# Patient Record
Sex: MALE | Race: WHITE | HISPANIC OR LATINO | Employment: FULL TIME | URBAN - METROPOLITAN AREA
[De-identification: names, ages, dates, MRNs, and addresses within clinical notes are randomized per-mention and may not be internally consistent; named-entity substitution may affect disease eponyms.]

---

## 2020-09-10 ENCOUNTER — APPOINTMENT (EMERGENCY)
Dept: CT IMAGING | Facility: HOSPITAL | Age: 45
End: 2020-09-10
Payer: COMMERCIAL

## 2020-09-10 ENCOUNTER — HOSPITAL ENCOUNTER (OUTPATIENT)
Facility: HOSPITAL | Age: 45
Setting detail: OBSERVATION
Discharge: HOME | End: 2020-09-12
Attending: EMERGENCY MEDICINE | Admitting: EMERGENCY MEDICINE
Payer: COMMERCIAL

## 2020-09-10 DIAGNOSIS — H53.8 BLURRED VISION, BILATERAL: ICD-10-CM

## 2020-09-10 DIAGNOSIS — R29.898 WEAKNESS OF BOTH LOWER EXTREMITIES: ICD-10-CM

## 2020-09-10 DIAGNOSIS — R29.898 LEFT ARM WEAKNESS: ICD-10-CM

## 2020-09-10 DIAGNOSIS — R29.898 WEAKNESS OF LEFT LOWER EXTREMITY: ICD-10-CM

## 2020-09-10 DIAGNOSIS — R51.9 HEADACHE: ICD-10-CM

## 2020-09-10 DIAGNOSIS — R07.9 CHEST PAIN: Primary | ICD-10-CM

## 2020-09-10 LAB
ALBUMIN SERPL BCP-MCNC: 3.5 G/DL (ref 3.5–5)
ALP SERPL-CCNC: 79 U/L (ref 46–116)
ALT SERPL W P-5'-P-CCNC: 43 U/L (ref 12–78)
ANION GAP SERPL CALCULATED.3IONS-SCNC: 10 MMOL/L (ref 4–13)
APTT PPP: 29 SECONDS (ref 23–37)
AST SERPL W P-5'-P-CCNC: 19 U/L (ref 5–45)
ATRIAL RATE: 87 BPM
BASOPHILS # BLD AUTO: 0.03 THOUSANDS/ΜL (ref 0–0.1)
BASOPHILS NFR BLD AUTO: 0 % (ref 0–1)
BILIRUB DIRECT SERPL-MCNC: 0.1 MG/DL (ref 0–0.2)
BILIRUB SERPL-MCNC: 0.4 MG/DL (ref 0.2–1)
BUN SERPL-MCNC: 13 MG/DL (ref 5–25)
CALCIUM SERPL-MCNC: 9.2 MG/DL (ref 8.3–10.1)
CHLORIDE SERPL-SCNC: 101 MMOL/L (ref 100–108)
CO2 SERPL-SCNC: 24 MMOL/L (ref 21–32)
CREAT SERPL-MCNC: 1.18 MG/DL (ref 0.6–1.3)
EOSINOPHIL # BLD AUTO: 0.17 THOUSAND/ΜL (ref 0–0.61)
EOSINOPHIL NFR BLD AUTO: 2 % (ref 0–6)
ERYTHROCYTE [DISTWIDTH] IN BLOOD BY AUTOMATED COUNT: 12.9 % (ref 11.6–15.1)
GFR SERPL CREATININE-BSD FRML MDRD: 75 ML/MIN/1.73SQ M
GLUCOSE SERPL-MCNC: 103 MG/DL (ref 65–140)
HCT VFR BLD AUTO: 53.7 % (ref 36.5–49.3)
HGB BLD-MCNC: 18 G/DL (ref 12–17)
IMM GRANULOCYTES # BLD AUTO: 0.02 THOUSAND/UL (ref 0–0.2)
IMM GRANULOCYTES NFR BLD AUTO: 0 % (ref 0–2)
INR PPP: 0.99 (ref 0.84–1.19)
LACTATE SERPL-SCNC: 1.5 MMOL/L (ref 0.5–2)
LIPASE SERPL-CCNC: 124 U/L (ref 73–393)
LYMPHOCYTES # BLD AUTO: 3.16 THOUSANDS/ΜL (ref 0.6–4.47)
LYMPHOCYTES NFR BLD AUTO: 37 % (ref 14–44)
MCH RBC QN AUTO: 30.1 PG (ref 26.8–34.3)
MCHC RBC AUTO-ENTMCNC: 33.5 G/DL (ref 31.4–37.4)
MCV RBC AUTO: 90 FL (ref 82–98)
MONOCYTES # BLD AUTO: 0.5 THOUSAND/ΜL (ref 0.17–1.22)
MONOCYTES NFR BLD AUTO: 6 % (ref 4–12)
NEUTROPHILS # BLD AUTO: 4.6 THOUSANDS/ΜL (ref 1.85–7.62)
NEUTS SEG NFR BLD AUTO: 55 % (ref 43–75)
NRBC BLD AUTO-RTO: 0 /100 WBCS
NT-PROBNP SERPL-MCNC: 129 PG/ML
P AXIS: 118 DEGREES
PLATELET # BLD AUTO: 349 THOUSANDS/UL (ref 149–390)
PMV BLD AUTO: 9.8 FL (ref 8.9–12.7)
POTASSIUM SERPL-SCNC: 4.2 MMOL/L (ref 3.5–5.3)
PR INTERVAL: 140 MS
PROT SERPL-MCNC: 7.4 G/DL (ref 6.4–8.2)
PROTHROMBIN TIME: 12.6 SECONDS (ref 11.6–14.5)
QRS AXIS: 79 DEGREES
QRSD INTERVAL: 80 MS
QT INTERVAL: 338 MS
QTC INTERVAL: 406 MS
RBC # BLD AUTO: 5.99 MILLION/UL (ref 3.88–5.62)
SODIUM SERPL-SCNC: 135 MMOL/L (ref 136–145)
T WAVE AXIS: 182 DEGREES
TROPONIN I SERPL-MCNC: <0.02 NG/ML
VENTRICULAR RATE: 87 BPM
WBC # BLD AUTO: 8.48 THOUSAND/UL (ref 4.31–10.16)

## 2020-09-10 PROCEDURE — 83690 ASSAY OF LIPASE: CPT | Performed by: PHYSICIAN ASSISTANT

## 2020-09-10 PROCEDURE — 74174 CTA ABD&PLVS W/CONTRAST: CPT

## 2020-09-10 PROCEDURE — 71275 CT ANGIOGRAPHY CHEST: CPT

## 2020-09-10 PROCEDURE — 85730 THROMBOPLASTIN TIME PARTIAL: CPT | Performed by: PHYSICIAN ASSISTANT

## 2020-09-10 PROCEDURE — 80048 BASIC METABOLIC PNL TOTAL CA: CPT | Performed by: PHYSICIAN ASSISTANT

## 2020-09-10 PROCEDURE — 83880 ASSAY OF NATRIURETIC PEPTIDE: CPT | Performed by: PHYSICIAN ASSISTANT

## 2020-09-10 PROCEDURE — 99285 EMERGENCY DEPT VISIT HI MDM: CPT

## 2020-09-10 PROCEDURE — 70496 CT ANGIOGRAPHY HEAD: CPT

## 2020-09-10 PROCEDURE — 93005 ELECTROCARDIOGRAM TRACING: CPT

## 2020-09-10 PROCEDURE — 85610 PROTHROMBIN TIME: CPT | Performed by: PHYSICIAN ASSISTANT

## 2020-09-10 PROCEDURE — 99291 CRITICAL CARE FIRST HOUR: CPT | Performed by: PHYSICIAN ASSISTANT

## 2020-09-10 PROCEDURE — 84484 ASSAY OF TROPONIN QUANT: CPT | Performed by: STUDENT IN AN ORGANIZED HEALTH CARE EDUCATION/TRAINING PROGRAM

## 2020-09-10 PROCEDURE — 99220 PR INITIAL OBSERVATION CARE/DAY 70 MINUTES: CPT | Performed by: STUDENT IN AN ORGANIZED HEALTH CARE EDUCATION/TRAINING PROGRAM

## 2020-09-10 PROCEDURE — G1004 CDSM NDSC: HCPCS

## 2020-09-10 PROCEDURE — 84484 ASSAY OF TROPONIN QUANT: CPT | Performed by: PHYSICIAN ASSISTANT

## 2020-09-10 PROCEDURE — 83605 ASSAY OF LACTIC ACID: CPT | Performed by: PHYSICIAN ASSISTANT

## 2020-09-10 PROCEDURE — 36415 COLL VENOUS BLD VENIPUNCTURE: CPT | Performed by: PHYSICIAN ASSISTANT

## 2020-09-10 PROCEDURE — 80076 HEPATIC FUNCTION PANEL: CPT | Performed by: PHYSICIAN ASSISTANT

## 2020-09-10 PROCEDURE — 70498 CT ANGIOGRAPHY NECK: CPT

## 2020-09-10 PROCEDURE — 93010 ELECTROCARDIOGRAM REPORT: CPT | Performed by: INTERNAL MEDICINE

## 2020-09-10 PROCEDURE — 85025 COMPLETE CBC W/AUTO DIFF WBC: CPT | Performed by: PHYSICIAN ASSISTANT

## 2020-09-10 RX ORDER — ASPIRIN 81 MG/1
81 TABLET, CHEWABLE ORAL DAILY
Status: DISCONTINUED | OUTPATIENT
Start: 2020-09-10 | End: 2020-09-12 | Stop reason: HOSPADM

## 2020-09-10 RX ORDER — KETOROLAC TROMETHAMINE 30 MG/ML
30 INJECTION, SOLUTION INTRAMUSCULAR; INTRAVENOUS EVERY 12 HOURS SCHEDULED
Status: DISCONTINUED | OUTPATIENT
Start: 2020-09-10 | End: 2020-09-11

## 2020-09-10 RX ORDER — DIPHENHYDRAMINE HYDROCHLORIDE 50 MG/ML
25 INJECTION INTRAMUSCULAR; INTRAVENOUS EVERY 8 HOURS PRN
Status: DISCONTINUED | OUTPATIENT
Start: 2020-09-10 | End: 2020-09-12 | Stop reason: HOSPADM

## 2020-09-10 RX ORDER — ATORVASTATIN CALCIUM 40 MG/1
40 TABLET, FILM COATED ORAL EVERY EVENING
Status: DISCONTINUED | OUTPATIENT
Start: 2020-09-10 | End: 2020-09-12 | Stop reason: HOSPADM

## 2020-09-10 RX ORDER — OXYCODONE HYDROCHLORIDE 5 MG/1
5 TABLET ORAL EVERY 6 HOURS PRN
Status: DISCONTINUED | OUTPATIENT
Start: 2020-09-10 | End: 2020-09-12 | Stop reason: HOSPADM

## 2020-09-10 RX ORDER — ACETAMINOPHEN 325 MG/1
650 TABLET ORAL EVERY 6 HOURS PRN
Status: DISCONTINUED | OUTPATIENT
Start: 2020-09-10 | End: 2020-09-12 | Stop reason: HOSPADM

## 2020-09-10 RX ORDER — METOCLOPRAMIDE HYDROCHLORIDE 5 MG/ML
10 INJECTION INTRAMUSCULAR; INTRAVENOUS EVERY 8 HOURS SCHEDULED
Status: DISCONTINUED | OUTPATIENT
Start: 2020-09-10 | End: 2020-09-12 | Stop reason: HOSPADM

## 2020-09-10 RX ORDER — HYDROMORPHONE HCL/PF 1 MG/ML
0.2 SYRINGE (ML) INJECTION EVERY 4 HOURS PRN
Status: DISCONTINUED | OUTPATIENT
Start: 2020-09-10 | End: 2020-09-12 | Stop reason: HOSPADM

## 2020-09-10 RX ADMIN — IOHEXOL 120 ML: 350 INJECTION, SOLUTION INTRAVENOUS at 11:13

## 2020-09-10 RX ADMIN — KETOROLAC TROMETHAMINE 30 MG: 30 INJECTION, SOLUTION INTRAMUSCULAR at 20:33

## 2020-09-10 RX ADMIN — METOCLOPRAMIDE HYDROCHLORIDE 10 MG: 5 INJECTION INTRAMUSCULAR; INTRAVENOUS at 18:16

## 2020-09-10 RX ADMIN — METOCLOPRAMIDE HYDROCHLORIDE 10 MG: 5 INJECTION INTRAMUSCULAR; INTRAVENOUS at 21:41

## 2020-09-10 RX ADMIN — ACETAMINOPHEN 650 MG: 325 TABLET, FILM COATED ORAL at 18:16

## 2020-09-10 NOTE — CASE MANAGEMENT
LOS 4 HOURS  RISK OF UNPLANNED READMISSION SCORE N/A  30 DAY READMISSION: NO  BUNDLE: NO    [place] with wife, Chiara Huff  Patient IPTA with no use of AD  No Hx VNA, STR, MH, or SA identified during this assessment  Patient did not identify a pharmacy as stating he does not take any Rx  Per patient's insurance card, patient does has a pharmacy plan  CM reviewed discharge planning process including the following: identifying help at home, patient preference for discharge planning needs, pharmacy preference, and availability of treatment team to discuss questions or concerns patient and/or family may have regarding understanding medications and recognizing signs and symptoms once discharged  CM also encouraged patient to follow up with all recommended appointments after discharge  Patient advised of importance for patient and family to participate in managing patients medical well being  CM name and role reviewed  Discharge Checklist reviewed and CM will continue to monitor for progress toward discharge goals in nursing and provider rounds  Patient works in [employer] for [employer]  At this time, plan is for cardiac workup including EKG, lab, chest CT, and CTA  No CM needs at this time  CM department to follow through discharge home with wife

## 2020-09-10 NOTE — ASSESSMENT & PLAN NOTE
Patient also complained of having epigastric chest pain, difficult to describe  Currently denies dyspnea, nausea, diaphoreses  Troponin negative x1  Continue trend troponin for 2 more times  Continue tele monitoring  Follow-up with hemoglobin A1c, lipid panel  Patient is agreeable with above plan

## 2020-09-10 NOTE — PROGRESS NOTES
Progress Note - Osbaldo Carrasco 1975, 39 y o  male MRN: [id number]    Unit/Bed#: ED 27 Encounter: [id number]    Primary Care Provider: No primary care provider on file  Date and time admitted to hospital: 9/10/2020 10:51 AM        * Headache  Assessment & Plan  39 male with past medical history of obesity, no other reported past medical history  Currently not on any medication  Presented with complaining of excruciating headache associated with blurry vision, chest pain, left-sided arm weakness  Patient reports that his symptom has been ongoing for last 2-3 weeks but this morning noted with excruciating headache which prompted him to come to ER  CTA head and neck negative for acute finding  CTA chest abdominal pelvis negative for acute finding  Labs unremarkable  On my encounter patient appears comfortable not in distress  Patient has multiple complaints but excruciating headache appears to be primary concern  Suspected complex migraine vs TIA  Will give migraine cocktail  Tylenol p r n  May consider using narcotics if not controlled with above  Follow-up with neurology consult  Patient is agreeable with above plan  Chest pain  Assessment & Plan  Patient also complained of having epigastric chest pain, difficult to describe  Currently denies dyspnea, nausea, diaphoreses  Troponin negative x1  Continue trend troponin for 2 more times  Continue tele monitoring  Follow-up with hemoglobin A1c, lipid panel  Patient is agreeable with above plan  VTE Prophylaxis: Enoxaparin (Lovenox)    Code Status:  Level 1 full code  POLST: POLST form is not discussed and not completed at this time  Discussion with family:  Not present at bedside  Anticipated Length of Stay:  Patient will be admitted on an Observation basis with an anticipated length of stay of  < 2 midnights     Justification for Hospital Stay:  Multiple complaints including excruciating headache, chest pain, left arm weakness  Total Time for Visit, including Counseling / Coordination of Care: 1 hour  Greater than 50% of this total time spent on direct patient counseling and coordination of care  Chief Complaint:   Multiple complaints    History of Present Illness:    Yadiel Agrawal is a 39 y o  male with past medical history obesity, no other reported past medical history who presents with multiple complaints  Patient has multiple complaints on presentation which is not able to elaborate in details  Reports that for last 2-3 weeks he has been having intermittent excruciating frontal headache associated with chest discomfort and left arm weakness  Reports this morning his headache was excruciating prompted him to come to ER  On my encounter patient is comfortable and not in distress  He does complain of having excruciating headache associated with blurry vision, chest discomfort and left arm weakness  Is requesting for pain medication for headache  Headache appears to be mostly concerning complaints at this time  Patient is also complaining of left index and middle finger pain and inability to flex due to pain  Denies neck stiffness, nausea, vomiting, fever, chills, skin rash, abdominal pain, dysuria, diarrhea, recent travel, recent sick contact, any other new complaints  Denies smoking  Denies any other substance use  Level 1 full code  No other events reported  Review of Systems:    Review of Systems   Constitutional: Negative for chills, diaphoresis, fatigue and fever  HENT: Negative for congestion  Eyes: Positive for visual disturbance  Respiratory: Negative for choking, chest tightness and shortness of breath  Cardiovascular: Positive for chest pain  Gastrointestinal: Negative for abdominal distention, diarrhea, nausea and vomiting  Endocrine: Negative for polyuria  Genitourinary: Negative for dysuria  Musculoskeletal: Negative for neck pain and neck stiffness     Skin: Negative for rash  Neurological: Positive for weakness and headaches  Negative for dizziness, tremors, syncope, facial asymmetry, speech difficulty, light-headedness and numbness  Psychiatric/Behavioral: Negative for agitation  Past Medical and Surgical History:     History reviewed  No pertinent past medical history  History reviewed  No pertinent surgical history  Meds/Allergies:    Prior to Admission medications    Not on File     I have reviewed home medications with patient personally  Allergies: No Known Allergies    Social History:     Marital Status: /Civil Union     Substance Use History:   Social History     Substance and Sexual Activity   Alcohol Use Yes    Frequency: 2-4 times a month    Drinks per session: 1 or 2     Social History     Tobacco Use   Smoking Status Never Smoker   Smokeless Tobacco Never Used     Social History     Substance and Sexual Activity   Drug Use Never       Family History:    non-contributory    Physical Exam:     Vitals:   Blood Pressure: 139/88 (09/10/20 1630)  Pulse: 88 (09/10/20 1630)  Temperature: 98 4 °F (36 9 °C) (09/10/20 1049)  Temp Source: Oral (09/10/20 1049)  Respirations: 17 (09/10/20 1630)  Height: 5' 8" (172 7 cm) (09/10/20 1049)  Weight - Scale: 109 kg (240 lb) (09/10/20 1049)  SpO2: 97 % (09/10/20 1630)    Physical Exam  Constitutional:       General: He is not in acute distress  Appearance: Normal appearance  He is not ill-appearing  HENT:      Head: Normocephalic and atraumatic  Nose: No rhinorrhea  Mouth/Throat:      Pharynx: No oropharyngeal exudate or posterior oropharyngeal erythema  Eyes:      General: No scleral icterus  Right eye: No discharge  Left eye: No discharge  Extraocular Movements: Extraocular movements intact  Conjunctiva/sclera: Conjunctivae normal       Pupils: Pupils are equal, round, and reactive to light     Neck:      Musculoskeletal: Normal range of motion and neck supple  No neck rigidity or muscular tenderness  Cardiovascular:      Rate and Rhythm: Normal rate and regular rhythm  Pulses: Normal pulses  Heart sounds: Normal heart sounds  Pulmonary:      Effort: Pulmonary effort is normal  No respiratory distress  Breath sounds: Normal breath sounds  No stridor  No wheezing or rhonchi  Abdominal:      General: Bowel sounds are normal  There is no distension  Palpations: Abdomen is soft  Tenderness: There is no abdominal tenderness  Musculoskeletal: Normal range of motion  General: No swelling  Comments: Left hand noted without any erythema, edema around index and middle finger  No obvious signs of trauma or inflammation noted  Skin:     Findings: No rash  Neurological:      General: No focal deficit present  Mental Status: He is alert and oriented to person, place, and time  Mental status is at baseline  Comments: No facial asymmetry noted  Left arm weakness noted, 4/5, no other focal gross neurological abnormality noted  Psychiatric:         Mood and Affect: Mood normal          Behavior: Behavior normal          Additional Data:     Lab Results: I have personally reviewed pertinent reports  Results from last 7 days   Lab Units 09/10/20  1105   WBC Thousand/uL 8 48   HEMOGLOBIN g/dL 18 0*   HEMATOCRIT % 53 7*   PLATELETS Thousands/uL 349   NEUTROS PCT % 55   LYMPHS PCT % 37   MONOS PCT % 6   EOS PCT % 2     Results from last 7 days   Lab Units 09/10/20  1206   SODIUM mmol/L 135*   POTASSIUM mmol/L 4 2   CHLORIDE mmol/L 101   CO2 mmol/L 24   BUN mg/dL 13   CREATININE mg/dL 1 18   ANION GAP mmol/L 10   CALCIUM mg/dL 9 2   ALBUMIN g/dL 3 5   TOTAL BILIRUBIN mg/dL 0 40   ALK PHOS U/L 79   ALT U/L 43   AST U/L 19   GLUCOSE RANDOM mg/dL 103     Results from last 7 days   Lab Units 09/10/20  1105   INR  0 99             Results from last 7 days   Lab Units 09/10/20  1124   LACTIC ACID mmol/L 1 5       Imaging:  I have personally reviewed pertinent reports  CTA dissection protocol chest abdomen pelvis w wo contrast   Final Result by Roman Mary MD (09/10 5129)      No CT angiographic abnormality to account for the patient's symptoms  Specifically, no evidence of aneurysm, arterial vascular dissection, or flow-limiting vascular stenosis in the chest, abdomen or pelvis  Hepatic steatosis  Workstation performed: [id number]         CTA head and neck with and without contrast   Final Result by Stan Velazquez MD (09/10 4399)      No mass effect, acute intracranial hemorrhage or evidence of recent infarction  No evidence for high-grade stenosis, focal occlusion or vascular aneurysm of the intracranial circulation  No evidence for high-grade stenosis or focal occlusion of the cervical vasculature  Workstation performed: [id number]             EKG, Pathology, and Other Studies Reviewed on Admission:   · EKG:  Not available to review if done    Allscripts / Epic Records Reviewed: Yes     ** Please Note: This note has been constructed using a voice recognition system   **

## 2020-09-10 NOTE — ASSESSMENT & PLAN NOTE
39 male with past medical history of obesity, no other reported past medical history  Currently not on any medication  Presented with complaining of excruciating headache associated with blurry vision, chest pain, left-sided arm weakness  Patient reports that his symptom has been ongoing for last 2-3 weeks but this morning noted with excruciating headache which prompted him to come to ER  CTA head and neck negative for acute finding  CTA chest abdominal pelvis negative for acute finding  Labs unremarkable  On my encounter patient appears comfortable not in distress  Patient has multiple complaints but excruciating headache appears to be primary concern  Suspected complex migraine vs TIA  Will give migraine cocktail  Tylenol p r n  May consider using narcotics if not controlled with above  Follow-up with neurology consult  Patient is agreeable with above plan

## 2020-09-10 NOTE — LETTER
[address] [address]  Dept: [phone]    September 12, 2020     Patient: Ethan Sutherland   YOB: 1975   Date of Visit: 9/10/2020       To Whom it May Concern:    Ethan Sutherland was under our professional care  He was seen in the hospital from 9/10/2020   to 09/12/20  May return to work on or after 09/18/2020 if he continues to feel well  Recommend re-evaluation by primary care provider before returning to work if clinically not feeling well to return to work  If you have any questions or concerns, please don't hesitate to call           Sincerely,          Immanuel Tiwari MD

## 2020-09-10 NOTE — ED PROVIDER NOTES
History  Chief Complaint   Patient presents with    Headache     Pt reports yesterday he started exp blurry vision, and unsteady gait; today woke up with a severe HA   Blurred Vision     39year old male with no significant past medical history presents to the emergency department with chief complaint of bilateral blurred vision, difficulty walking and left arm weakness has headache starting at 1 day ago  Location of symptoms reported as both legs, left arm, eyes and head  Quality is reported as blurred vision to both eyes  Quality is reported as weakness to both legs and left arm  Severity is reported as moderate  Associated symptoms:  Positive for chest pain  Denies shortness of breath  Denies diaphoresis  Denies fevers  Positive for lightheadedness  Positive for headache  Denies vomiting  Denies loss of vision  Denies loss of hearing  Positive for difficulty walking  Denies abdominal pain  Modifying factors:  Patient reports unsure of any aggravating or alleviating factors  Context:  Denies any recent fall injury or trauma  Denies prior similar episodes in the past   Patient states yesterday he was experiencing some substernal chest pain described as sharp  He reports he was noticing weakness in both his arms and his legs yesterday  This morning he woke up with left greater than right arm weakness associated with headache  He also notice some increasing blurred vision which started yesterday knee feels is worse today  He denies any over-the-counter medications  He works for the [employer] in [place]  Reviewed past visits via [employer]:  No prior visits to this emergency department        History provided by:  Patient   used: No    Headache   Associated symptoms: dizziness, fatigue and weakness    Associated symptoms: no abdominal pain, no back pain, no congestion, no cough, no diarrhea, no drainage, no ear pain, no eye pain, no fever, no hearing loss, no myalgias, no nausea, no neck pain, no neck stiffness, no numbness, no photophobia, no seizures, no sinus pressure, no sore throat and no vomiting        None       History reviewed  No pertinent past medical history  History reviewed  No pertinent surgical history  History reviewed  No pertinent family history  I have reviewed and agree with the history as documented  E-Cigarette/Vaping     E-Cigarette/Vaping Substances     Social History     Tobacco Use    Smoking status: Never Smoker    Smokeless tobacco: Never Used   Substance Use Topics    Alcohol use: Yes     Frequency: 2-4 times a month     Drinks per session: 1 or 2    Drug use: Never       Review of Systems   Constitutional: Positive for fatigue  Negative for activity change, appetite change, chills, diaphoresis, fever and unexpected weight change  HENT: Negative for congestion, dental problem, drooling, ear discharge, ear pain, facial swelling, hearing loss, mouth sores, nosebleeds, postnasal drip, rhinorrhea, sinus pressure, sinus pain, sneezing, sore throat, tinnitus, trouble swallowing and voice change  Eyes: Positive for visual disturbance  Negative for photophobia, pain, discharge, redness and itching  Respiratory: Negative for cough, chest tightness, shortness of breath and wheezing  Cardiovascular: Positive for chest pain  Negative for palpitations and leg swelling  Gastrointestinal: Negative for abdominal distention, abdominal pain, anal bleeding, blood in stool, constipation, diarrhea, nausea and vomiting  Endocrine: Negative for cold intolerance, heat intolerance, polydipsia, polyphagia and polyuria  Genitourinary: Negative for difficulty urinating, dysuria, flank pain, frequency, hematuria and urgency  Musculoskeletal: Positive for gait problem  Negative for arthralgias, back pain, joint swelling, myalgias, neck pain and neck stiffness  Skin: Negative for color change, pallor, rash and wound     Allergic/Immunologic: Negative for environmental allergies, food allergies and immunocompromised state  Neurological: Positive for dizziness, weakness and headaches  Negative for tremors, seizures, syncope, facial asymmetry, speech difficulty, light-headedness and numbness  Hematological: Negative for adenopathy  Does not bruise/bleed easily  Psychiatric/Behavioral: Negative for agitation, confusion and hallucinations  The patient is not nervous/anxious  All other systems reviewed and are negative  Physical Exam  Physical Exam  Vitals signs and nursing note reviewed  Constitutional:       General: He is not in acute distress  Appearance: Normal appearance  He is well-developed  He is not diaphoretic  Comments:  vs   HENT:      Head: Normocephalic and atraumatic  Right Ear: Tympanic membrane, ear canal and external ear normal       Left Ear: Tympanic membrane, ear canal and external ear normal       Nose: Nose normal  No congestion or rhinorrhea  Mouth/Throat:      Mouth: Mucous membranes are moist       Pharynx: No oropharyngeal exudate or posterior oropharyngeal erythema  Eyes:      General: No scleral icterus  Right eye: No discharge  Left eye: No discharge  Extraocular Movements: Extraocular movements intact  Conjunctiva/sclera: Conjunctivae normal       Pupils: Pupils are equal, round, and reactive to light  Comments: EOMI intact  No visual field cuts  Patient able to open and close both eyes equally bilaterally  Neck:      Musculoskeletal: Normal range of motion and neck supple  No neck rigidity or muscular tenderness  Thyroid: No thyromegaly  Vascular: No JVD  Trachea: No tracheal deviation  Cardiovascular:      Rate and Rhythm: Normal rate and regular rhythm  Heart sounds: S1 normal and S2 normal    Pulmonary:      Effort: Pulmonary effort is normal  No respiratory distress  Breath sounds: Normal breath sounds  No stridor     Chest:      Chest wall: No tenderness  Abdominal:      General: Bowel sounds are normal  There is no distension  Palpations: Abdomen is soft  There is no mass  Tenderness: There is no abdominal tenderness  Hernia: No hernia is present  Musculoskeletal: Normal range of motion  General: No swelling, tenderness, deformity or signs of injury  Skin:     General: Skin is warm and dry  Capillary Refill: Capillary refill takes less than 2 seconds  Coloration: Skin is not jaundiced or pale  Findings: No bruising, erythema, lesion or rash  Neurological:      Mental Status: He is alert and oriented to person, place, and time  Cranial Nerves: No cranial nerve deficit  Sensory: No sensory deficit  Motor: Weakness and pronator drift present  No abnormal muscle tone or seizure activity  Coordination: Coordination abnormal  Heel to [facility] Test abnormal       Gait: Gait abnormal       Comments: 2/4 strength left hand ,  3/4 strength right hand   Psychiatric:         Mood and Affect: Mood normal          Behavior: Behavior normal          Thought Content:  Thought content normal          Judgment: Judgment normal          Vital Signs  ED Triage Vitals [09/10/20 1049]   Temperature Pulse Respirations Blood Pressure SpO2   98 4 °F (36 9 °C) 90 19 151/100 99 %      Temp Source Heart Rate Source Patient Position - Orthostatic VS BP Location FiO2 (%)   Oral Monitor Sitting Left arm --      Pain Score       Worst Possible Pain           Vitals:    09/10/20 1049 09/10/20 1115 09/10/20 1200 09/10/20 1215   BP: 151/100 140/81 126/85 123/94   Pulse: 90 76 79 80   Patient Position - Orthostatic VS: Sitting Lying Lying Lying         Visual Acuity  Visual Acuity      Most Recent Value   L Pupil Size (mm)  2   R Pupil Size (mm)  2          ED Medications  Medications   iohexol (OMNIPAQUE) 350 MG/ML injection (MULTI-DOSE) 120 mL (120 mL Intravenous Given 9/10/20 1113)       Diagnostic Studies  Results Reviewed     Procedure Component Value Units Date/Time    Basic metabolic panel [[id number]]  (Abnormal) Collected:  09/10/20 1206    Lab Status:  Final result Specimen:  Blood from Arm, Right Updated:  09/10/20 1248     Sodium 135 mmol/L      Potassium 4 2 mmol/L      Chloride 101 mmol/L      CO2 24 mmol/L      ANION GAP 10 mmol/L      BUN 13 mg/dL      Creatinine 1 18 mg/dL      Glucose 103 mg/dL      Calcium 9 2 mg/dL      eGFR 75 ml/min/1 73sq m     Narrative:       [place] guidelines for Chronic Kidney Disease (CKD):     Stage 1 with normal or high GFR (GFR > 90 mL/min/1 73 square meters)    Stage 2 Mild CKD (GFR = 60-89 mL/min/1 73 square meters)    Stage 3A Moderate CKD (GFR = 45-59 mL/min/1 73 square meters)    Stage 3B Moderate CKD (GFR = 30-44 mL/min/1 73 square meters)    Stage 4 Severe CKD (GFR = 15-29 mL/min/1 73 square meters)    Stage 5 End Stage CKD (GFR <15 mL/min/1 73 square meters)  Note: GFR calculation is accurate only with a steady state creatinine    Hepatic function panel [[id number]]  (Normal) Collected:  09/10/20 1206    Lab Status:  Final result Specimen:  Blood from Arm, Right Updated:  09/10/20 1248     Total Bilirubin 0 40 mg/dL      Bilirubin, Direct 0 10 mg/dL      Alkaline Phosphatase 79 U/L      AST 19 U/L      ALT 43 U/L      Total Protein 7 4 g/dL      Albumin 3 5 g/dL     Lipase [[id number]]  (Normal) Collected:  09/10/20 1206    Lab Status:  Final result Specimen:  Blood from Arm, Right Updated:  09/10/20 1248     Lipase 124 u/L     NT-BNP PRO [[id number]]  (Abnormal) Collected:  09/10/20 1206    Lab Status:  Final result Specimen:  Blood from Arm, Right Updated:  09/10/20 1248     NT-proBNP 129 pg/mL     Lactic acid [[id number]]  (Normal) Collected:  09/10/20 1124    Lab Status:  Final result Specimen:  Blood from Arm, Left Updated:  09/10/20 1158     LACTIC ACID 1 5 mmol/L     Narrative:       Result may be elevated if tourniquet was used during collection  Troponin I [[id number]]  (Normal) Collected:  09/10/20 1105    Lab Status:  Final result Specimen:  Blood from Arm, Right Updated:  09/10/20 1134     Troponin I <0 02 ng/mL     Protime-INR [[id number]]  (Normal) Collected:  09/10/20 1105    Lab Status:  Final result Specimen:  Blood from Arm, Right Updated:  09/10/20 1127     Protime 12 6 seconds      INR 0 99    APTT [[id number]]  (Normal) Collected:  09/10/20 1105    Lab Status:  Final result Specimen:  Blood from Arm, Right Updated:  09/10/20 1127     PTT 29 seconds     CBC and differential [[id number]]  (Abnormal) Collected:  09/10/20 1105    Lab Status:  Final result Specimen:  Blood from Arm, Right Updated:  09/10/20 1121     WBC 8 48 Thousand/uL      RBC 5 99 Million/uL      Hemoglobin 18 0 g/dL      Hematocrit 53 7 %      MCV 90 fL      MCH 30 1 pg      MCHC 33 5 g/dL      RDW 12 9 %      MPV 9 8 fL      Platelets 264 Thousands/uL      nRBC 0 /100 WBCs      Neutrophils Relative 55 %      Immat GRANS % 0 %      Lymphocytes Relative 37 %      Monocytes Relative 6 %      Eosinophils Relative 2 %      Basophils Relative 0 %      Neutrophils Absolute 4 60 Thousands/µL      Immature Grans Absolute 0 02 Thousand/uL      Lymphocytes Absolute 3 16 Thousands/µL      Monocytes Absolute 0 50 Thousand/µL      Eosinophils Absolute 0 17 Thousand/µL      Basophils Absolute 0 03 Thousands/µL                  CTA dissection protocol chest abdomen pelvis w wo contrast   Final Result by Annie Freeman MD (09/10 1159)      No CT angiographic abnormality to account for the patient's symptoms  Specifically, no evidence of aneurysm, arterial vascular dissection, or flow-limiting vascular stenosis in the chest, abdomen or pelvis  Hepatic steatosis                    Workstation performed: [id number]         CTA head and neck with and without contrast   Final Result by Chidi Diaz MD (09/10 1143)      No mass effect, acute intracranial hemorrhage or evidence of recent infarction  No evidence for high-grade stenosis, focal occlusion or vascular aneurysm of the intracranial circulation  No evidence for high-grade stenosis or focal occlusion of the cervical vasculature  Workstation performed: [id number]                    Procedures  ECG 12 Lead Documentation Only    Date/Time: 9/10/2020 10:58 AM  Performed by: Yelena Bledsoe PA-C  Authorized by: Yelena Bledsoe PA-C     Indications / Diagnosis:  C/p  ECG reviewed by me, the ED Provider: yes    Patient location:  ED  Previous ECG:     Previous ECG:  Unavailable    Comparison to cardiac monitor: Yes    Interpretation:     Interpretation: normal    Rate:     ECG rate:  87    ECG rate assessment: normal    Rhythm:     Rhythm: sinus rhythm    Ectopy:     Ectopy: none    QRS:     QRS axis:  Normal    QRS intervals:  Normal  Conduction:     Conduction: normal    ST segments:     ST segments:  Normal  T waves:     T waves: inverted      Inverted:  I, II, aVL and aVF             ED Course  ED Course as of Sep 10 1344   Thu Sep 10, 2020   1149 Lab results reviewed  Troponin normal less than 0 02  INR normal at 0 99  No coagulopathy  CBC demonstrates normal white blood cell count of 8 4, hemoglobin of 18 0 hematocrit of 53 7 are elevated  1150 CT head and neck images independently visualized interpreted by me  Radiology report was reviewed:No mass effect, acute intracranial hemorrhage or evidence of recent infarction  No evidence for high-grade stenosis, focal occlusion or vascular aneurysm of the intracranial circulation        No evidence for high-grade stenosis or focal occlusion of the cervical vasculature         18 Spoke with MARLENI Hoffman regarding admission               HEART Risk Score      Most Recent Value   Heart Score Risk Calculator   History  2 Filed at: 09/10/2020 1334   ECG  2 Filed at: 09/10/2020 1334   Age  0 Filed at: 09/10/2020 1334   Risk Factors  1 Filed at: 09/10/2020 1334   Troponin  0 Filed at: 09/10/2020 1334   HEART Score  5 Filed at: 09/10/2020 1334          Stroke Assessment     Row Name 09/10/20 1050 09/10/20 1336          NIH Stroke Scale    Interval  Baseline  Baseline     Level of Consciousness (1a )  0       LOC Questions (1b )  0       LOC Commands (1c )  0       Best Gaze (2 )  0       Visual (3 )  0       Facial Palsy (4 )  0       Motor Arm, Left (5a )  1       Motor Arm, Right (5b )  0       Motor Leg, Left (6a )  2       Motor Leg, Right (6b )  2       Limb Ataxia (7 )  2       Sensory (8 )  0       Best Language (9 )  0       Dysarthria (10 )  0       Extinction and Inattention (11 ) (Formerly Neglect)  0       Total  7           First Filed Value   TPA Decision  Patient not a TPA candidate  Patient is not a candidate options  Unclear time of onset outside appropriate time window  MDM  Number of Diagnoses or Management Options  Blurred vision, bilateral: new and requires workup  Chest pain: new and requires workup  Left arm weakness: new and requires workup  Weakness of both lower extremities: new and requires workup  Diagnosis management comments: Ddx includes but is not limited to:  1  ACS/USA  2  Pneumothorax  3  Pneumonia  4  Pleural effusion  5  Pericarditis  6  Pericardial effusion  7  Chest wall pain/costochondritis  8  Esophageal spasm  9  Pulmonary embolism  10  Bronchitis/bronchospasm  11  Aortic dissection  12  Stroke  ! 3  Intracranial bleed  Plan cardiac workup including ekg, labs, ct chest for dissection, cta head neck for stroke, bleed  Determination of disposition to made based upon risk factors, workup results and patient's ED coarse  Patient does not meet stroke alert criteria due to onset of symptoms over 24 hours ago/not TPA candidate            Amount and/or Complexity of Data Reviewed  Clinical lab tests: ordered and reviewed  Tests in the radiology section of CPT®: ordered and reviewed  Tests in the medicine section of CPT®: ordered and reviewed  Discussion of test results with the performing providers: yes  Obtain history from someone other than the patient: yes  Review and summarize past medical records: yes  Discuss the patient with other providers: yes (JAYLYN/Dr Vangie Salazar)  Independent visualization of images, tracings, or specimens: yes    Risk of Complications, Morbidity, and/or Mortality  General comments: Lab results reviewed  Basic metabolic panel demonstrates a BUN of 13 and creatinine 1 1 which are normal   No renal failure  Potassium normal 4 2  Lipase normal at 124  No pancreatitis  BNP is mildly elevated at 129  Lactic acid normal 1 5  CTA of the chest abdomen pelvis images independently visualized interpreted by me  Radiology report reviewed:FINDINGS:     VASCULAR STRUCTURES:  No aortic dissection   No aortic aneurysm   Minimal atherosclerotic change noted   No atherosclerotic stenosis of aorta or major aortic branch vessels  Limited opacification of pulmonary arterial tree demonstrates no evidence of large or central pulmonary embolus  OTHER FINDINGS:     CHEST:     HEART:  Normal cardiac size  No pericardial effusion  LUNGS:  Unremarkable  PLEURA: No pleural effusion  MEDIASTINUM AND ASHLEY:  No mass or significant lymphadenopathy  CHEST WALL AND LOWER NECK: Normal      ABDOMEN     LIVER/BILIARY TREE:  Unremarkable  GALLBLADDER:  No calcified gallstones  No pericholecystic inflammatory change  SPLEEN:  Unremarkable   Normal size  PANCREAS:  Unremarkable  ADRENAL GLANDS:  Unremarkable  KIDNEYS/URETERS:  No solid renal mass  No hydronephrosis  No urinary tract calculi  PELVIS     REPRODUCTIVE ORGANS:  Prostate is mildly enlarged  URINARY BLADDER:  Unremarkable  ADDITIONAL ABDOMINAL AND PELVIC STRUCTURES:     STOMACH AND BOWEL:  Unremarkable       ABDOMINOPELVIC CAVITY:  No pathologically enlarged mesenteric or retroperitoneal lymph nodes  No ascites or free intraperitoneal air  ABDOMINAL WALL/INGUINAL REGIONS: Rolf Cheek is a small fat-containing umbilical hernia  OSSEOUS STRUCTURES:  No acute fracture or destructive osseous lesion   Of mild lumbar degenerative changes   No CT findings to suggest severe central canal narrowing  I reviewed all test results with patient at bedside  Discussed with patient will admit for further evaluation of abnormal ekg and weakness/blurred vision symptoms  Patient currently chest pain free  Case discussed with Dr Libby Read regarding admission     The critical care time is separate of other billable procedures, treating other patients, and any teaching time  The critical care time was for: obtaining history from patient or surrogate, development of treatment plan with patient or surrogate, discussion with any applicable consultants, examination of the patient,ordering and performing treatments and interventions, ordering and reviewing any applicable laboratory or radiographic studies, reassessment of the patient for response to treatment, reviewing any pertinent and available old records, documentation time  The critical care time was necessary to prevent deterioration of the following organ systems: neurovascular/cardiovascular systems - chest pain/bilateral leg and left greater than right arm weakness concerning for stroke/consideration given to stroke/administration of TPA  but is outside of window for TPA   Alternately consider aortic dissection given lower extremity symptoms and chest pain vs ACS  Multiple possible life threatening causes of symptoms with abnormal ekg        Time spent (in minutes):60          Patient Progress  Patient progress: stable      Disposition  Final diagnoses:   Chest pain   Weakness of both lower extremities   Blurred vision, bilateral   Left arm weakness     Time reflects when diagnosis was documented in both MDM as applicable and the Disposition within this note     Time User Action Codes Description Comment    9/10/2020  1:29 PM Pereira Neat Add [R07 9] Chest pain     9/10/2020  1:29 PM Pereira Neat Add [R29 898] Weakness of both lower extremities     9/10/2020  1:29 PM Pereira Neat Add [H53 8] Blurred vision, bilateral     9/10/2020  1:30 PM Pereira Neat Add [R29 898] Left arm weakness       ED Disposition     ED Disposition Condition Date/Time Comment    Admit Stable Thu Sep 10, 2020  1:29 PM Case was discussed with Dr Ming Leon and the patient's admission status was agreed to be Admission Status: observation status to the service of Dr Ming Leon   Follow-up Information    None         Patient's Medications    No medications on file     No discharge procedures on file      PDMP Review     None          ED Provider  Electronically Signed by           Ophelia Amezquita PA-C  09/10/20 9709

## 2020-09-10 NOTE — ED NOTES
1  Chief Complaint: headache/blurred vision      2  Is this admission related to an injury?: no     3  Orientation Status: A&Ox 3     4  Abnormal Labs/ Abnormal Focused Assessment/ Abnormal Vitals:   Sodium 135 mmol/L  NT-proBNP 129 pg/mL  RBC 5 99 Million/uL  Hemoglobin 18 0 g/dL  Hematocrit 53 7 %    5  Medication/ Drips:      6  Last narcotic/ pain medication given:      7  IV lines/ drains/ etc: 18g      8  Isolation Status: None     9  Skin: intact     10   Ambulatory Status: Assist due to numbness in feet      11: ED Phone Number: [phone]     [facility]  09/10/20 [address] [facility]  09/10/20 1448

## 2020-09-11 ENCOUNTER — APPOINTMENT (OUTPATIENT)
Dept: MRI IMAGING | Facility: HOSPITAL | Age: 45
End: 2020-09-11
Payer: COMMERCIAL

## 2020-09-11 ENCOUNTER — APPOINTMENT (OUTPATIENT)
Dept: RADIOLOGY | Facility: HOSPITAL | Age: 45
End: 2020-09-11
Payer: COMMERCIAL

## 2020-09-11 PROBLEM — E78.1 HYPERTRIGLYCERIDEMIA: Status: ACTIVE | Noted: 2020-09-11

## 2020-09-11 PROBLEM — M79.645 FINGER PAIN, LEFT: Status: ACTIVE | Noted: 2020-09-11

## 2020-09-11 PROBLEM — R29.898 WEAKNESS OF LEFT LOWER EXTREMITY: Status: ACTIVE | Noted: 2020-09-11

## 2020-09-11 LAB
CHOLEST SERPL-MCNC: 198 MG/DL (ref 50–200)
EST. AVERAGE GLUCOSE BLD GHB EST-MCNC: 123 MG/DL
HBA1C MFR BLD: 5.9 %
HDLC SERPL-MCNC: 30 MG/DL
LDLC SERPL CALC-MCNC: 97 MG/DL (ref 0–100)
TRIGL SERPL-MCNC: 354 MG/DL
TROPONIN I SERPL-MCNC: <0.02 NG/ML

## 2020-09-11 PROCEDURE — 97167 OT EVAL HIGH COMPLEX 60 MIN: CPT

## 2020-09-11 PROCEDURE — 99245 OFF/OP CONSLTJ NEW/EST HI 55: CPT | Performed by: PSYCHIATRY & NEUROLOGY

## 2020-09-11 PROCEDURE — 70551 MRI BRAIN STEM W/O DYE: CPT

## 2020-09-11 PROCEDURE — G1004 CDSM NDSC: HCPCS

## 2020-09-11 PROCEDURE — 99225 PR SBSQ OBSERVATION CARE/DAY 25 MINUTES: CPT | Performed by: STUDENT IN AN ORGANIZED HEALTH CARE EDUCATION/TRAINING PROGRAM

## 2020-09-11 PROCEDURE — 80061 LIPID PANEL: CPT | Performed by: STUDENT IN AN ORGANIZED HEALTH CARE EDUCATION/TRAINING PROGRAM

## 2020-09-11 PROCEDURE — 83036 HEMOGLOBIN GLYCOSYLATED A1C: CPT | Performed by: STUDENT IN AN ORGANIZED HEALTH CARE EDUCATION/TRAINING PROGRAM

## 2020-09-11 PROCEDURE — 92610 EVALUATE SWALLOWING FUNCTION: CPT

## 2020-09-11 PROCEDURE — 73120 X-RAY EXAM OF HAND: CPT

## 2020-09-11 PROCEDURE — 97163 PT EVAL HIGH COMPLEX 45 MIN: CPT

## 2020-09-11 RX ORDER — SODIUM CHLORIDE 9 MG/ML
75 INJECTION, SOLUTION INTRAVENOUS CONTINUOUS
Status: DISCONTINUED | OUTPATIENT
Start: 2020-09-11 | End: 2020-09-12 | Stop reason: HOSPADM

## 2020-09-11 RX ORDER — MAGNESIUM SULFATE 1 G/100ML
1 INJECTION INTRAVENOUS 2 TIMES DAILY
Status: DISCONTINUED | OUTPATIENT
Start: 2020-09-11 | End: 2020-09-12 | Stop reason: HOSPADM

## 2020-09-11 RX ORDER — KETOROLAC TROMETHAMINE 30 MG/ML
30 INJECTION, SOLUTION INTRAMUSCULAR; INTRAVENOUS EVERY 8 HOURS
Status: DISCONTINUED | OUTPATIENT
Start: 2020-09-11 | End: 2020-09-12 | Stop reason: HOSPADM

## 2020-09-11 RX ORDER — PANTOPRAZOLE SODIUM 40 MG/1
40 TABLET, DELAYED RELEASE ORAL
Status: DISCONTINUED | OUTPATIENT
Start: 2020-09-11 | End: 2020-09-12 | Stop reason: HOSPADM

## 2020-09-11 RX ORDER — PREDNISONE 20 MG/1
40 TABLET ORAL DAILY
Status: DISCONTINUED | OUTPATIENT
Start: 2020-09-11 | End: 2020-09-12 | Stop reason: HOSPADM

## 2020-09-11 RX ADMIN — ATORVASTATIN CALCIUM 40 MG: 40 TABLET, FILM COATED ORAL at 17:00

## 2020-09-11 RX ADMIN — ENOXAPARIN SODIUM 40 MG: 40 INJECTION SUBCUTANEOUS at 10:08

## 2020-09-11 RX ADMIN — PREDNISONE 40 MG: 20 TABLET ORAL at 14:50

## 2020-09-11 RX ADMIN — METOCLOPRAMIDE HYDROCHLORIDE 10 MG: 5 INJECTION INTRAMUSCULAR; INTRAVENOUS at 23:12

## 2020-09-11 RX ADMIN — METOCLOPRAMIDE HYDROCHLORIDE 10 MG: 5 INJECTION INTRAMUSCULAR; INTRAVENOUS at 05:11

## 2020-09-11 RX ADMIN — ATORVASTATIN CALCIUM 40 MG: 40 TABLET, FILM COATED ORAL at 02:12

## 2020-09-11 RX ADMIN — ASPIRIN 81 MG 81 MG: 81 TABLET ORAL at 10:07

## 2020-09-11 RX ADMIN — PANTOPRAZOLE SODIUM 40 MG: 40 TABLET, DELAYED RELEASE ORAL at 14:50

## 2020-09-11 RX ADMIN — MAGNESIUM SULFATE HEPTAHYDRATE 1 G: 1 INJECTION, SOLUTION INTRAVENOUS at 22:09

## 2020-09-11 RX ADMIN — SODIUM CHLORIDE 75 ML/HR: 0.9 INJECTION, SOLUTION INTRAVENOUS at 10:23

## 2020-09-11 RX ADMIN — KETOROLAC TROMETHAMINE 30 MG: 30 INJECTION, SOLUTION INTRAMUSCULAR at 17:00

## 2020-09-11 RX ADMIN — MAGNESIUM SULFATE HEPTAHYDRATE 1 G: 1 INJECTION, SOLUTION INTRAVENOUS at 10:08

## 2020-09-11 RX ADMIN — METOCLOPRAMIDE HYDROCHLORIDE 10 MG: 5 INJECTION INTRAMUSCULAR; INTRAVENOUS at 15:02

## 2020-09-11 RX ADMIN — ASPIRIN 81 MG 81 MG: 81 TABLET ORAL at 02:12

## 2020-09-11 NOTE — ASSESSMENT & PLAN NOTE
-elevated triglycerides at 354  -HDL low at 30    -patient advised dietary changes and to increase physical activity  -nutrition consult pending

## 2020-09-11 NOTE — ASSESSMENT & PLAN NOTE
-patient complaining index finger pain left hand  -denies any trauma or injury  -finger mildly swollen, no erythema or warmth  -X-ray left hand ordered    -will follow x-ray  -prednisone 40 mg once a day for 5 days; pantoprazole 40

## 2020-09-11 NOTE — PLAN OF CARE
Problem: Potential for Falls  Goal: Patient will remain free of falls  Description: INTERVENTIONS:  - Assess patient frequently for physical needs  -  Identify cognitive and physical deficits and behaviors that affect risk of falls    -  [place] fall precautions as indicated by assessment   - Educate patient/family on patient safety including physical limitations  - Instruct patient to call for assistance with activity based on assessment  - Modify environment to reduce risk of injury  - Consider OT/PT consult to assist with strengthening/mobility  Outcome: Progressing     Problem: Prexisting or High Potential for Compromised Skin Integrity  Goal: Skin integrity is maintained or improved  Description: INTERVENTIONS:  - Identify patients at risk for skin breakdown  - Assess and monitor skin integrity  - Assess and monitor nutrition and hydration status  - Monitor labs   - Assess for incontinence   - Turn and reposition patient  - Assist with mobility/ambulation  - Relieve pressure over bony prominences  - Avoid friction and shearing  - Provide appropriate hygiene as needed including keeping skin clean and dry  - Evaluate need for skin moisturizer/barrier cream  - Collaborate with interdisciplinary team   - Patient/family teaching  - Consider wound care consult   Outcome: Progressing     Problem: NEUROSENSORY - ADULT  Goal: Achieves stable or improved neurological status  Description: INTERVENTIONS  - Monitor and report changes in neurological status  - Monitor vital signs such as temperature, blood pressure, glucose, and any other labs ordered   - Initiate measures to prevent increased intracranial pressure  - Monitor for seizure activity and implement precautions if appropriate      Outcome: Progressing  Goal: Achieves maximal functionality and self care  Description: INTERVENTIONS  - Monitor swallowing and airway patency with patient fatigue and changes in neurological status  - Encourage and assist patient to increase activity and self care  - Encourage visually impaired, hearing impaired and aphasic patients to use assistive/communication devices  Outcome: Progressing     Problem: Neurological Deficit  Goal: Neurological status is stable or improving  Description: Interventions:  - Monitor and assess patient's level of consciousness, motor function, sensory function, and level of assistance needed for ADLs  - Monitor and report changes from baseline  Collaborate with interdisciplinary team to initiate plan and implement interventions as ordered  - Provide and maintain a safe environment  - Consider seizure precautions  - Consider fall precautions  - Consider aspiration precautions  - Consider bleeding precautions  Outcome: Progressing     Problem: Activity Intolerance/Impaired Mobility  Goal: Mobility/activity is maintained at optimum level for patient  Description: Interventions:  - Assess and monitor patient  barriers to mobility and need for assistive/adaptive devices  - Assess patient's emotional response to limitations  - Collaborate with interdisciplinary team and initiate plans and interventions as ordered  - Encourage independent activity per ability   - Maintain proper body alignment  - Perform active/passive rom as tolerated/ordered  - Plan activities to conserve energy   - Turn patient as appropriate  Outcome: Progressing     Problem: Communication Impairment  Goal: Ability to express needs and understand communication  Description: Assess patient's communication skills and ability to understand information  Patient will demonstrate use of effective communication techniques, alternative methods of communication and understanding even if not able to speak  - Encourage communication and provide alternate methods of communication as needed  - Collaborate with case management/ for discharge needs    - Include patient/family/caregiver in decisions related to communication  Outcome: Progressing     Problem: Potential for Aspiration  Goal: Non-ventilated patient's risk of aspiration is minimized  Description: Assess and monitor vital signs, respiratory status, and labs (WBC)  Monitor for signs of aspiration (tachypnea, cough, rales, wheezing, cyanosis, fever)  - Assess and monitor patient's ability to swallow  - Place patient up in chair to eat if possible  - HOB up at 90 degrees to eat if unable to get patient up into chair   - Supervise patient during oral intake  - Instruct patient/ family to take small bites  - Instruct patient/ family to take small single sips when taking liquids  - Follow patient-specific strategies generated by speech pathologist   Outcome: Progressing  Goal: Ventilated patient's risk of aspiration is minimized  Description: Assess and monitor vital signs, respiratory status, airway cuff pressure, and labs (WBC)  Monitor for signs of aspiration (tachypnea, cough, rales, wheezing, cyanosis, fever)  - Elevate head of bed 30 degrees if patient has tube feeding   - Monitor tube feeding  Outcome: Progressing     Problem: Nutrition  Goal: Nutrition/Hydration status is improving  Description: Monitor and assess patient's nutrition/hydration status for malnutrition (ex- brittle hair, bruises, dry skin, pale skin and conjunctiva, muscle wasting, smooth red tongue, and disorientation)  Collaborate with interdisciplinary team and initiate plan and interventions as ordered  Monitor patient's weight and dietary intake as ordered or per policy  Utilize nutrition screening tool and intervene per policy  Determine patient's food preferences and provide high-protein, high-caloric foods as appropriate  - Assist patient with eating   - Allow adequate time for meals   - Encourage patient to take dietary supplement as ordered  - Collaborate with clinical nutritionist   - Include patient/family/caregiver in decisions related to nutrition    Outcome: Progressing

## 2020-09-11 NOTE — UTILIZATION REVIEW
Initial Clinical Review    Admission: Date/Time/Statement:     Start    Ordered    09/10/20 1331    Place in Observation  Once      Transfer Service: General Medicine       Question  Answer  Comment    Admitting Physician  Allison Moraes     Level of Care  Med Surg         09/10/20 1330       ED Arrival Information     Expected Arrival Acuity Means of Arrival Escorted By Service Admission Type    - 9/10/2020 10:23 Emergent Walk-In Spouse General Medicine Emergency    Arrival Complaint    SEVERE HEADACHE        Chief Complaint   Patient presents with    Headache     Pt reports yesterday he started exp blurry vision, and unsteady gait; today woke up with a severe HA   Blurred Vision     Assessment/Plan:39year old male to the ED from home with complaints of severe headache, blurred vision which started the morning of his arrival   Admitted under observation for headache, chest pain  He has blurred vision bilaterally with diffuse weakness to both arms and legs  Symptoms ongoing for 2-3 weeks  He arrives with difficulty walking with abnormal coordination  2/4 strength left hand ,  3/4 strength right hand   CTA head/neck shows no abnormality  Troponin normal   Continue to trend  NIHSS 7  Not a TPA candidate as unclear time of onset  Give migraine cocktail  Suspect complex migraine vs TIA  Given REglan and toradol IV in the ED with some relief  NEuro consult  GCS=15    9/11 UPdate: Start aspirin, atorvastatin  Labetalol is SBP >200  Per Speech eval, Recommendations: regular diet and thin liquids   Complains of pain in left finger  Mildly swollen index finger of left hand  No erythema, edema  Check xray  Per PT/OT evaluation, recommended for STR when medical cleared for dc      9/11 Neuro consult: On exam today, headache is a 4-5/10 pain, but continues to have weakness and sensory changes in left LE  Etiology remains unclear, but concern for CVA vs complex migraine   Prelim MRI viewed and shows no Acute infarct  Likely complex migraine  Continue toradol, reglan, benadryl  Start magnesium IV BID       ED Triage Vitals [09/10/20 1049]   Temperature Pulse Respirations Blood Pressure SpO2   98 4 °F (36 9 °C) 90 19 151/100 99 %      Temp Source Heart Rate Source Patient Position - Orthostatic VS BP Location FiO2 (%)   Oral Monitor Sitting Left arm --      Pain Score       Worst Possible Pain          Wt Readings from Last 1 Encounters:   09/10/20 109 kg (240 lb)     Additional Vital Signs:   Date/Time  Temp  Pulse   Resp   BP   MAP (mmHg)   SpO2   O2 Device  Patient Position - Orthostatic VS    09/11/20 07:45:58  97 9 °F (36 6 °C)     18   128/85   99            09/11/20 0700  97 9 °F (36 6 °C)  94   18   128/85      97 %   None (Room air)  Lying    09/10/20 23:58:41  98 5 °F (36 9 °C)  75   16   125/82   96   94 %   None (Room air)  Lying    09/10/20 2300  98 3 °F (36 8 °C)  83      127/86      98 %   None (Room air)  Lying    09/10/20 22:15:12    85      128/86   100   97 %   None (Room air)      09/10/20 2130    86   16   117/68   86   98 %   None (Room air)  Lying    09/10/20 2100    84   18   106/56   75   98 %   None (Room air)  Lying    09/10/20 1500    80   19   140/78      98 %   None (Room air)  Sitting    09/10/20 1400    90   18   145/78      98 %   None (Room air)  Sitting    09/10/20 1330    90   22   128/87   103   98 %         09/10/20 1315    81   13   121/83   97   99 %         09/10/20 1300    81   14   132/86   105   98 %         09/10/20 1245    83   12   135/88   106   98 %         09/10/20 1230    84   14   124/94   105   99 %         09/10/20 1215    80   14   123/94   104   96 %   None (Room air)  Lying    09/10/20 1200    80   12    126/85   101   96 %    None (Room air)  Lying    Resp: Simultaneous filing  User may not have seen previous data  at 09/10/20 1200    SpO2: Simultaneous filing  User may not have seen previous data   at 09/10/20 1200 09/10/20 1145    79   15   125/84   99   98 %         09/10/20 1130    77   15   121/77   94   99 %         09/10/20 1115    76   14   140/81   106   99 %   None (Room air)  Lying    09/10/20 1049  98 4 °F (36 9 °C)  90   19   151/100             Pertinent Labs/Diagnostic Test Results:   9/11 Xray left hand:  No acute osseous abnormality  9/11 MRI:Scattered tiny supratentorial white matter foci, likely represent mild chronic microangiopathic change including the type which can accompany migraine headaches  MS or Lyme disease not excluded  These findings not visible by CT  9/10 CTA C/A/P: No CT angiographic abnormality to account for the patient's symptoms   Specifically, no evidence of aneurysm, arterial vascular dissection, or flow-limiting vascular stenosis in the chest, abdomen or pelvis  Hepatic steatosis  9/10 CTA head/neck:   No mass effect, acute intracranial hemorrhage or evidence of recent infarction  No evidence for high-grade stenosis, focal occlusion or vascular aneurysm of the intracranial circulation      No evidence for high-grade stenosis or focal occlusion of the cervical vasculature  9/10 EKG:  Interpretation:     Interpretation: normal    Rate:     ECG rate:  87    ECG rate assessment: normal    Rhythm:     Rhythm: sinus rhythm    Ectopy:     Ectopy: none    QRS:     QRS axis:  Normal    QRS intervals:  Normal  Conduction:     Conduction: normal    ST segments:     ST segments:  Normal  T waves:     T waves: inverted      Inverted:  I, II, aVL and aVF         Results from last 7 days   Lab Units 09/10/20  1105   WBC Thousand/uL 8 48   HEMOGLOBIN g/dL 18 0*   HEMATOCRIT % 53 7*   PLATELETS Thousands/uL 349   NEUTROS ABS Thousands/µL 4 60         Results from last 7 days   Lab Units 09/10/20  1206   SODIUM mmol/L 135*   POTASSIUM mmol/L 4 2   CHLORIDE mmol/L 101   CO2 mmol/L 24   ANION GAP mmol/L 10   BUN mg/dL 13   CREATININE mg/dL 1 18   EGFR ml/min/1 73sq m 75   CALCIUM mg/dL 9 2     Results from last 7 days   Lab Units 09/10/20  1206   AST U/L 19   ALT U/L 43   ALK PHOS U/L 79   TOTAL PROTEIN g/dL 7 4   ALBUMIN g/dL 3 5   TOTAL BILIRUBIN mg/dL 0 40   BILIRUBIN DIRECT mg/dL 0 10         Results from last 7 days   Lab Units 09/10/20  1206   GLUCOSE RANDOM mg/dL 103         Results from last 7 days   Lab Units 09/10/20  2343 09/10/20  2038 09/10/20  1824 09/10/20  1105   TROPONIN I ng/mL <0 02 <0 02 <0 02 <0 02         Results from last 7 days   Lab Units 09/10/20  1105   PROTIME seconds 12 6   INR  0 99   PTT seconds 29             Results from last 7 days   Lab Units 09/10/20  1124   LACTIC ACID mmol/L 1 5       Results from last 7 days   Lab Units 09/10/20  1206   NT-PRO BNP pg/mL 129*             Results from last 7 days   Lab Units 09/10/20  1206   LIPASE u/L 124                   ED Treatment:   Medication Administration from 09/10/2020 1023 to 09/10/2020 2211       Date/Time Order Dose Route Action     09/10/2020 2141 metoclopramide (REGLAN) injection 10 mg 10 mg Intravenous Given     09/10/2020 1816 metoclopramide (REGLAN) injection 10 mg 10 mg Intravenous Given     09/10/2020 2033 ketorolac (TORADOL) injection 30 mg 30 mg Intravenous Given     09/10/2020 1816 acetaminophen (TYLENOL) tablet 650 mg 650 mg Oral Given          Admitting Diagnosis: Blurred vision [H53 8]  Chest pain [R07 9]  Left arm weakness [R29 898]  Blurred vision, bilateral [H53 8]  Headache [R51]  Weakness of both lower extremities [R29 898]  Age/Sex: 39 y o  male  Admission Orders:  Neuro checks: Every 1 hour x 4 hours, then every 2 hours x 8 hours, then every 4 hours x 72 hours   Tele  NIHSS  Up and oOB    Scheduled Medications:  aspirin, 81 mg, Oral, Daily  atorvastatin, 40 mg, Oral, QPM  enoxaparin, 40 mg, Subcutaneous, Daily  ketorolac, 30 mg, Intravenous, Q12H TRISTA  metoclopramide, 10 mg, Intravenous, Q8H [address]      Continuous IV Infusions:     PRN Meds:  acetaminophen, 650 mg, Oral, Q6H PRN  diphenhydrAMINE, 25 mg, Intravenous, Q8H PRN  HYDROmorphone, 0 2 mg, Intravenous, Q4H PRN  oxyCODONE, 5 mg, Oral, Q6H PRN        IP CONSULT TO NEUROLOGY  IP CONSULT TO CASE MANAGEMENT  IP CONSULT TO NUTRITION SERVICES    Network Utilization Review Department  [email or web address]  ATTENTION: Please call with any questions or concerns to [phone] and carefully listen to the prompts so that you are directed to the right person  All voicemails are confidential   [facility] all requests for admission clinical reviews, approved or denied determinations and any other requests to dedicated fax number below belonging to the campus where the patient is receiving treatment   List of dedicated fax numbers for the Facilities:  [address] DENIALS (Administrative/Medical Necessity) [phone]   [address] (Maternity/NICU/Pediatrics) [phone]   Dorina Poot [phone]   [place] [phone]   [facility] [place] [phone]   [facility] [place] [phone]   [address] [facility] [address] [facility] [phone]   [facility]  [phone]   [address] [facility] [address]  [address] [facility] [address] [facility] [phone]

## 2020-09-11 NOTE — PLAN OF CARE
Problem: OCCUPATIONAL THERAPY ADULT  Goal: Performs self-care activities at highest level of function for planned discharge setting  See evaluation for individualized goals  Description: Treatment Interventions: ADL retraining, Functional transfer training, UE strengthening/ROM, Endurance training, Cognitive reorientation, Patient/family training, Equipment evaluation/education, Neuromuscular reeducation, Fine motor coordination activities, Compensatory technique education, Continued evaluation, Energy conservation, Activityengagement, Visual perceptual retraining          See flowsheet documentation for full assessment, interventions and recommendations  Note: Limitation: Decreased ADL status, Decreased UE ROM, Decreased UE strength, Decreased Safe judgement during ADL, Decreased endurance, Decreased self-care trans, Decreased high-level ADLs, Visual deficit, Decreased fine motor control     Assessment: Pt is a 39 y o  male seen for OT evaluation (time 5631-0680) s/p admit to [facility] on 9/10/2020 w/ Headache  Comorbidities affecting pt's functional performance at time of assessment include: chest pain  Personal factors affecting pt at time of IE include:steps to enter environment, difficulty performing ADLS, difficulty performing IADLS  and health management   Prior to admission, Pt reports INDEP with ADLs, IADLs, and functional mobility  Upon evaluation: Based on functional assessment, pt requires Min A for grooming, UB ADLs, LB ADLs, toileting, sit<>stand transfers with RW, and functional mobility <> bathroom using RW; pt requires supervision for supine<>sit transfer 2* the following deficits impacting occupational performance: weakness, decreased ROM, decreased strength, decreased balance, decreased tolerance, impaired GMC, impaired [address], abnormal tone, increased pain and decreased coping skills  Cognition appears to be [facility] and vision is impaired - please refer to flowsheet above for details   Pt to benefit from continued skilled OT tx while in the hospital to address deficits as defined above and maximize level of functional independence w ADL's and functional mobility  Occupational Performance areas to address include: eating, grooming, bathing/shower, toilet hygiene, dressing, medication management, socialization, health maintenance, functional mobility, community mobility, money management, household maintenance, job performance/volunteering and social participation  From OT standpoint, recommendation at time of d/c would be post acute rehab       OT Discharge Recommendation: Post-Acute Rehabilitation Services  OT - OK to Discharge: (once medically cleared)

## 2020-09-11 NOTE — OCCUPATIONAL THERAPY NOTE
Occupational Therapy Evaluation     Patient Name: Eriberto Brothers  Today's Date: 9/11/2020  Problem List  Principal Problem:    Headache  Active Problems:    Chest pain    Weakness of left lower extremity and sensory changes    Past Medical History  History reviewed  No pertinent past medical history  Past Surgical History  History reviewed  No pertinent surgical history  09/11/20 0810   OT Last Visit   OT Visit Date 09/11/20   Note Type   Note type Eval only   Restrictions/Precautions   Weight Bearing Precautions Per Order No   Braces or Orthoses   (none per pt)   Other Precautions Chair Alarm; Bed Alarm;Multiple lines;Telemetry; Fall Risk;Pain   Pain Assessment   Pain Assessment Tool 0-10   Pain Location/Orientation Location: Head;Orientation: Left; Location: Arm;Location: Leg   Hospital Pain Intervention(s) Emotional support; Ambulation/increased activity   Home Living   Type of [address] Multi-level;Bed/bath upstairs  (split level; 3 HOUSTON; 5 steps upstairs, 5 steps downstairs)   Bathroom Shower/Tub Tub/shower unit   Bathroom Toilet Standard   Bathroom Equipment Grab bars in shower   [address]   (no DME)   Prior Function   Level of [place] Independent with ADLs and functional mobility   Lives With Spouse; Son   Morgan Help From Family   ADL Assistance Independent   IADLs Independent  (spouse mostly completes meal prep, cleaning, laundry)   Falls in the last 6 months 0   Vocational Full time employment  ()   Comments Pt drives   Lifestyle   Autonomy Pt reports INDEP with ADLs, IADLs, and functional mobility   Reciprocal Relationships Pt has supportive family   Service to Others Pt works full time as a    Intrinsic Gratification Pt enjoys his job   Psychosocial   Psychosocial (WDL) WDL   Subjective   Subjective pt is pleasant and cooperative   ADL   Grooming Assistance 4  Minimal Assistance   [address] [place] [address] Minimal Assistance   LB Bathing Assistance 4  Minimal Assistance   UB Dressing Assistance 4  Minimal Assistance   LB Dressing Assistance 4  Minimal Assistance   LB Dressing Deficit   (educated on compensatory technique due to L index pain)   Toileting Assistance  4  Minimal Assistance   Additional Comments Assist levels as outlined above are based on functional assessment of performance skills and deficits  Bed Mobility   Supine to Sit 5  Supervision   Additional items Increased time required; Bedrails;HOB elevated   Sit to Supine 5  Supervision   Additional items Increased time required; Bedrails;HOB elevated   Additional Comments Pt seated EOB at start of session and agreeable to OT  During initial conversation pt decided to return to supine, then again completed supine>sit transfer to EOB with supervision  Pt seated in b/s chair at end of session with all needs met, call bell within reach, and chair alarm active  Transfers   Sit to Stand 4  Minimal assistance   Additional items Assist x 1; Increased time required;Verbal cues   Stand to Sit 4  Minimal assistance   Additional items Assist x 1; Increased time required;Verbal cues   Toilet transfer 4  Minimal assistance   Additional items Assist x 1; Increased time required;Verbal cues; Commode;Armrests   Additional Comments Pt used RW for UE support   Functional Mobility   Functional Mobility 4  Minimal assistance   Additional Comments Min Ax1 for short functional distance <> bathroom using RW  Pt unsteady due to L LE     Balance   Static Sitting Fair +   Static Standing Poor +   Ambulatory Poor +   Activity Tolerance   Activity Tolerance Patient limited by fatigue;Patient limited by pain   Medical Staff Made Aware  [address]   Nurse Made Aware notified RN Isaac of outcomes   RUE Assessment   RUE Assessment X   RUE Overall AROM   R Shoulder Flexion WFL   R Elbow Flexion WFL   R Wrist Flexion WFL   R Mass Grasp WFL   RUE Strength   RUE Overall Strength Within Functional Limits - strength 5/5   R Shoulder Flexion 5/5   R Elbow Extension 5/5   LUE Assessment   LUE Assessment X   LUE Overall AROM   L Shoulder Flexion <90*   L Elbow Flexion limited   L Wrist Flexion limited   L Mass Grasp limited   LUE Strength   LUE Overall Strength Deficits   L Shoulder Flexion 3-/5   L Elbow Extension 3-/5   Hand Function   Gross Motor Coordination Impaired  (dysmetria with finger to nose)   Fine Motor Coordination Impaired   Sensation   Additional Comments pt reports pain in L UE and LE with AROM/PROM   Vision-Basic Assessment   Current Vision Does not wear glasses   Vision - Complex Assessment   Tracking Able to track stimulus in all quads without difficulty   Saccades Decreased speed of saccadic movement  (pt reports increase in headache with testing)   Convergence   (WFL)   Diplopia Assessment Present in near gaze;Present in far gaze   Additional Comments Pt denies light sensitivity  When asked to look at the clock, pt reports "the clock is moving"  Pt reports also that he sees 2 of therapist when both eyes open  Then pt reports seeing 4 of therapist when L eye is closed  When R eye is closed, pt reports "I can't really see you at all now, you're very blurry"   Cognition   Overall Cognitive Status [facility]   Arousal/Participation Alert; Cooperative   Attention Within functional limits   Orientation Level Oriented X4   Memory Within functional limits   Following Commands Follows all commands and directions without difficulty   Comments Pt able to identify self by full name and birthdate  Pt primary language is [place] however is able to communicate in [place]  Assessment   Limitation Decreased ADL status; Decreased UE ROM; Decreased UE strength;Decreased Safe judgement during ADL;Decreased endurance;Decreased self-care trans;Decreased high-level ADLs; Visual deficit; Decreased fine motor control   Assessment Pt is a 39 y o  male seen for OT evaluation (time 9962-8065) s/p admit to [facility] on 9/10/2020 w/ Headache  Comorbidities affecting pt's functional performance at time of assessment include: chest pain  Personal factors affecting pt at time of IE include:steps to enter environment, difficulty performing ADLS, difficulty performing IADLS  and health management   Prior to admission, Pt reports INDEP with ADLs, IADLs, and functional mobility  Upon evaluation: Based on functional assessment, pt requires Min A for grooming, UB ADLs, LB ADLs, toileting, sit<>stand transfers with RW, and functional mobility <> bathroom using RW; pt requires supervision for supine<>sit transfer 2* the following deficits impacting occupational performance: weakness, decreased ROM, decreased strength, decreased balance, decreased tolerance, impaired GMC, impaired [address], abnormal tone, increased pain and decreased coping skills  Cognition appears to be [facility] and vision is impaired - please refer to flowsheet above for details  Pt to benefit from continued skilled OT tx while in the hospital to address deficits as defined above and maximize level of functional independence w ADL's and functional mobility  Occupational Performance areas to address include: eating, grooming, bathing/shower, toilet hygiene, dressing, medication management, socialization, health maintenance, functional mobility, community mobility, money management, household maintenance, job performance/volunteering and social participation  From OT standpoint, recommendation at time of d/c would be post acute rehab  Goals   Patient Goals "I want to get back to work"   Plan   Treatment Interventions ADL retraining;Functional transfer training;UE strengthening/ROM; Endurance training;Cognitive reorientation;Patient/family training;Equipment evaluation/education; Neuromuscular reeducation; Fine motor coordination activities; Compensatory technique education;Continued evaluation; Energy conservation; Activityengagement;Visual perceptual retraining   Goal Expiration Date 09/21/20   OT Frequency 3-5x/wk   Recommendation   OT Discharge Recommendation Post-Acute Rehabilitation Services   OT - OK to Discharge   (once medically cleared)   Barthel Index   Feeding 5   Bathing 0   Grooming Score 0   Dressing Score 5   Bladder Score 10   Bowels Score 10   Toilet Use Score 5   Transfers (Bed/Chair) Score 10   Mobility (Level Surface) Score 0   Stairs Score 0   Barthel Index Score 45     Goals to be met within 10 days:    Pt will complete grooming/oral hygiene tasks Mod I while standing at sink    Pt will complete UB bathing/dressing Mod I while seated    Pt will complete LB bathing/dressing Mod I while seated     Pt will improve functional activity tolerance while standing at sink to 15+ minutes in order to increase safety and independence during functional transfers and ADL tasks  Pt will improve dynamic sitting/standing balance to good to increase safety and independence during functional transfers and ADL and decrease risk for falls  Pt will increase independence during STS transfers with least restrictive AD Mod I     Pt will complete transfer to raised toilet with grab bars Mod I     Pt will complete toileting tasks (clothing management up/down, hygiene) Mod I     Pt will complete tub/shower transfer without LOB with supervision after set-up using tub bench and grab bars    Pt will attend to continued visual perceptual assessment 100% of the time in order to provide most appropriate recommendations for d/c plans  Pt will identify and implement at least 3 healthy coping strategies to increase participation in meaningful activities and decrease risk for readmission      Brijesh Allred OTR/L

## 2020-09-11 NOTE — PLAN OF CARE
Problem: Potential for Falls  Goal: Patient will remain free of falls  Description: INTERVENTIONS:  - Assess patient frequently for physical needs  -  Identify cognitive and physical deficits and behaviors that affect risk of falls    -  [place] fall precautions as indicated by assessment   - Educate patient/family on patient safety including physical limitations  - Instruct patient to call for assistance with activity based on assessment  - Modify environment to reduce risk of injury  - Consider OT/PT consult to assist with strengthening/mobility  Outcome: Progressing     Problem: Prexisting or High Potential for Compromised Skin Integrity  Goal: Skin integrity is maintained or improved  Description: INTERVENTIONS:  - Identify patients at risk for skin breakdown  - Assess and monitor skin integrity  - Assess and monitor nutrition and hydration status  - Monitor labs   - Assess for incontinence   - Turn and reposition patient  - Assist with mobility/ambulation  - Relieve pressure over bony prominences  - Avoid friction and shearing  - Provide appropriate hygiene as needed including keeping skin clean and dry  - Evaluate need for skin moisturizer/barrier cream  - Collaborate with interdisciplinary team   - Patient/family teaching  - Consider wound care consult   Outcome: Progressing     Problem: NEUROSENSORY - ADULT  Goal: Achieves stable or improved neurological status  Description: INTERVENTIONS  - Monitor and report changes in neurological status  - Monitor vital signs such as temperature, blood pressure, glucose, and any other labs ordered   - Initiate measures to prevent increased intracranial pressure  - Monitor for seizure activity and implement precautions if appropriate      Outcome: Progressing  Goal: Achieves maximal functionality and self care  Description: INTERVENTIONS  - Monitor swallowing and airway patency with patient fatigue and changes in neurological status  - Encourage and assist patient to increase activity and self care     - Encourage visually impaired, hearing impaired and aphasic patients to use assistive/communication devices  Outcome: Progressing

## 2020-09-11 NOTE — NURSING NOTE
Patient arrived on unit at 2210  Alert  No c/o  Oriented to room  Call bell within reach  Able to make needs known  Advised to use call bell for assistance when getting OOB

## 2020-09-11 NOTE — SPEECH THERAPY NOTE
Speech-Language Pathology Bedside Swallow Evaluation        Patient Name: Trever Reid    Today's Date: 9/11/2020     Problem List  Patient Active Problem List   Diagnosis    Headache    Chest pain    Weakness of left lower extremity and sensory changes    Finger pain, left       Past Medical History  History reviewed  No pertinent past medical history  Past Surgical History  History reviewed  No pertinent surgical history  Current Medical Status  Pt is a 39 y o  male who presented to [facility] with multiple complaints  Patient has multiple complaints on presentation which is not able to elaborate in details  Reports that for last 2-3 weeks he has been having intermittent excruciating frontal headache associated with chest discomfort and left arm weakness  Reports this morning his headache was excruciating prompted him to come to ER  On my encounter patient is comfortable and not in distress  He does complain of having excruciating headache associated with blurry vision, chest discomfort and left arm weakness  Is requesting for pain medication for headache  Headache appears to be mostly concerning complaints at this time  Patient is also complaining of left index and middle finger pain and inability to flex due to pain  Upon my arrival patient denies dysarthria, aphasia or dysphagia  Past medical history:   Please see H&P for details    Special Studies:  MRI brain pending    9/10 CTA head/neck: No mass effect, acute intracranial hemorrhage or evidence of recent infarction  No evidence for high-grade stenosis, focal occlusion or vascular aneurysm of the intracranial circulation  No evidence for high-grade stenosis or focal occlusion of the cervical vasculature  9/10 CTA chest/ab/pelvis dissection protocol: No CT angiographic abnormality to account for the patient's symptoms    Specifically, no evidence of aneurysm, arterial vascular dissection, or flow-limiting vascular stenosis in the chest, abdomen or pelvis  Hepatic steatosis  Swallow Information   Current Risks for Dysphagia & Aspiration: stroke pathway     Current Symptoms/Concerns: none reported    Current Diet: regular diet and thin liquids      Baseline Diet: regular diet and thin liquids      Baseline Assessment   Behavior/Cognition: alert    Speech/Language Status: able to participate in conversation and able to follow commands    Patient Positioning: upright in recliner      Swallow Mechanism Exam   Facial: symmetrical  Labial: WFL  Lingual: WFL  Velum: symmetrical  Mandible: adequate ROM  Dentition: adequate  Vocal quality:clear/adequate   Volitional Cough: strong/productive   Resp: RA    Consistencies Assessed and Performance   Consistencies Administered: thin liquids, puree, soft solids and hard solids  Specific materials administered included    Oral Stage: WFL  Mastication was adequate with the materials administered today  Bolus formation and transfer were functional with nosignificant oral residue noted  No overt s/s reduced oral control  Pharyngeal Stage: [facility]  Swallowing initiation appeared prompt  Laryngeal rise was palpated and judged to be within functional limits  No coughing, throat clearing, change in vocal quality or respiratory status noted today  Esophageal Concerns: none reported    Summary   Pts oropharyngeal swallow function appears generally WFL at this time with the materials administered today  He does not appear to be at risk for aspiration at this time      Recommendations: regular diet and thin liquids     Recommended Form of Meds: no restrictions     Results Reviewed with: patient and RN     Plan  No further ST indicated at this time    CHEMA Lorenzo S , [address] [facility]  Speech Language Pathologist   Available via [address] [place] [address] #[phone]  [place] #[id number]

## 2020-09-11 NOTE — PLAN OF CARE
Problem: PHYSICAL THERAPY ADULT  Goal: Performs mobility at highest level of function for planned discharge setting  See evaluation for individualized goals  Description: Treatment/Interventions: Functional transfer training, LE strengthening/ROM, Elevations, Therapeutic exercise, Endurance training, Patient/family training, Equipment eval/education, Bed mobility, Gait training, Spoke to nursing, OT  Equipment Recommended: (TBD)       See flowsheet documentation for full assessment, interventions and recommendations  Note: Prognosis: Good  Problem List: Decreased strength, Decreased endurance, Impaired balance, Decreased mobility, Impaired sensation, Pain, Impaired vision  Assessment: Pt is 39 y o  male seen for PT evaluation on 9/11/2020 s/p [facility] 9[facility] w/ Headache  PT consulted to assess pt's functional mobility and d/c needs  Order placed for PT eval and tx, w/ up and OOB as tolerated order  Performed at least 2 patient identifiers during session: Name and wristband  PTA, pt was independent w/ all functional mobility w/ no AD/DME, ambulates unrestricted distances and all terrain, has 3 HOUSTON, lives w/ wife and family in multi level mesfin and works full time  Personal factors affecting pt at time of IE include: inaccessible home environment, ambulating w/ assistive device, stairs to enter home, inability to navigate community distances, limited home support, preferred language not [place] (language barrier) and decreased initiation and engagement  Please find objective findings from PT assessment regarding body systems outlined above with impairments and limitations including weakness, impaired balance, gait deviations, pain, decreased activity tolerance, altered sensation and fall risk, as well as mobility assessment (need for min assist w/ all phases of mobility when usually ambulating independently and need for cueing for mobility technique)   The following objective measures performed on IE also reveal limitations: Barthel Index: 45/100 and Modified [place]: 4 (moderate/severe disability)  Pt's clinical presentation is currently unstable/unpredictable seen in pt's presentation of abnormal lab value(s), need for input for task focus and mobility technique, on telemetry monitoring and ongoing medical assessment  Pt to benefit from continued PT tx to address deficits as defined above and maximize level of functional independent mobility and consistency  From PT/mobility standpoint, recommendation at time of d/c would be STR pending progress in order to facilitate return to PLOF  Barriers to Discharge: Inaccessible home environment, Decreased caregiver support     PT Discharge Recommendation: [address]     PT - OK to Discharge: Yes(when medically cleared if to STR)    See flowsheet documentation for full assessment

## 2020-09-11 NOTE — H&P
H&P- Jia Hancock 1975, 39 y o  male MRN: [id number]    Unit/Bed#: -01 Encounter: [id number]    Primary Care Provider: No primary care provider on file  Date and time admitted to hospital: 9/10/2020 10:51 AM            H&P Done earlier and entered as Progress not in error  Refer to progress note on 9/10/2020

## 2020-09-11 NOTE — PLAN OF CARE
Problem: Potential for Falls  Goal: Patient will remain free of falls  Description: INTERVENTIONS:  - Assess patient frequently for physical needs  -  Identify cognitive and physical deficits and behaviors that affect risk of falls    -  [place] fall precautions as indicated by assessment   - Educate patient/family on patient safety including physical limitations  - Instruct patient to call for assistance with activity based on assessment  - Modify environment to reduce risk of injury  - Consider OT/PT consult to assist with strengthening/mobility  Outcome: Progressing     Problem: Prexisting or High Potential for Compromised Skin Integrity  Goal: Skin integrity is maintained or improved  Description: INTERVENTIONS:  - Identify patients at risk for skin breakdown  - Assess and monitor skin integrity  - Assess and monitor nutrition and hydration status  - Monitor labs   - Assess for incontinence   - Turn and reposition patient  - Assist with mobility/ambulation  - Relieve pressure over bony prominences  - Avoid friction and shearing  - Provide appropriate hygiene as needed including keeping skin clean and dry  - Evaluate need for skin moisturizer/barrier cream  - Collaborate with interdisciplinary team   - Patient/family teaching  - Consider wound care consult   Outcome: Progressing     Problem: NEUROSENSORY - ADULT  Goal: Achieves stable or improved neurological status  Description: INTERVENTIONS  - Monitor and report changes in neurological status  - Monitor vital signs such as temperature, blood pressure, glucose, and any other labs ordered   - Initiate measures to prevent increased intracranial pressure  - Monitor for seizure activity and implement precautions if appropriate      Outcome: Progressing  Goal: Achieves maximal functionality and self care  Description: INTERVENTIONS  - Monitor swallowing and airway patency with patient fatigue and changes in neurological status  - Encourage and assist patient to increase activity and self care     - Encourage visually impaired, hearing impaired and aphasic patients to use assistive/communication devices  Outcome: Progressing

## 2020-09-11 NOTE — ASSESSMENT & PLAN NOTE
- Current headache regimen:   · Toradol 30 mg Q8h scheduled  · Reglan 10 mg Q8h scheduled   · Benadryl 25 mg Q8h scheduled   · Magnesium 1g BID x 2 days   - Medical management and supportive care per primary team  Correction of any metabolic or infectious disturbances     - As headache has resolved, patient is stable for discharge from a neurologic standpoint

## 2020-09-11 NOTE — ASSESSMENT & PLAN NOTE
-left arm and leg weakness present appear to have improved since presentation  -PT OT:  Recommends post acute rehab  -neurology on board, see above    -will continue to monitor

## 2020-09-11 NOTE — PROGRESS NOTES
Progress Note - Ethan Cody 1975, 39 y o  male MRN: [id number]    Unit/Bed#: -01 Encounter: [id number]    Primary Care Provider: No primary care provider on file  Date and time admitted to hospital: 9/10/2020 10:51 AM        * Headache  Assessment & Plan  -complaint of excruciating headache, associated with blurry vision, chest pain, left arm and leg weakness  -CT head and neck negative for acute findings  -CT chest abdomen pelvis negative for acute findings  -neurology on board  -MRI ordered  -echo ordered  -speech eval pending    -per neurology recommendations, patient started on aspirin 81 mg daily, atorvastatin 40 mg daily, magnesium  -labetalol if SBP over 200  -frequent neuro checks  -follow-up results of MRI and echo  -continuing pain management      Chest pain  Assessment & Plan  -troponin X 4 were normal  -BMP mildly elevated at 129  -CTA chest abdomen and pelvis had no acute finding  -EKG in the ED was normal  -not complaining of any chest pain at time of examination  -triglycerides elevated at 354  -A1c of 5 9   -ASCVD:  3 9%    -echo pending  -Continue tele monitoring        Hypertriglyceridemia  Assessment & Plan  -elevated triglycerides at 354  -HDL low at 30    -patient advised dietary changes and to increase physical activity  -nutrition consult pending    Finger pain, left  Assessment & Plan  -patient complaining index finger pain left hand  -denies any trauma or injury  -finger mildly swollen, no erythema or warmth  -X-ray left hand ordered    -will follow x-ray  -prednisone 40 mg once a day for 5 days; pantoprazole 40    Weakness of left lower extremity and sensory changes  Assessment & Plan  -left arm and leg weakness present appear to have improved since presentation  -PT OT:  Recommends post acute rehab  -neurology on board, see above    -will continue to monitor          VTE Pharmacologic Prophylaxis:   Pharmacologic: Enoxaparin (Lovenox)  Mechanical VTE Prophylaxis in Place: No    Discussions with Specialists or Other Care Team Provider:  Nurse, OT PT, neurology    Education and Discussions with Family / Patient:  Discussed with patient    Current Length of Stay: 0 day(s)    Current Patient Status: Observation     Discharge Plan / Estimated Discharge Date:  Pending    Code Status: Level 1 - Full Code      Subjective:   Patient was seen and examined by me at bedside  Communicated clearly  No particular overnight event reported  Hemodynamically stable and afebrile  Patient states improvement in his headache and left-sided weakness since presentation  He would rate his headache as 4/10 today, was previously 10/10 on presentation  No nausea or vomiting  Patient still complaining of pain in his left index finger  Denies any chest pain at time of examination, SOB, palpitations  Objective:     Vitals:   Temp (24hrs), Av 1 °F (36 7 °C), Min:97 6 °F (36 4 °C), Max:98 5 °F (36 9 °C)    Temp:  [97 6 °F (36 4 °C)-98 5 °F (36 9 °C)] 98 2 °F (36 8 °C)  HR:  [] 71  Resp:  [16-22] 18  BP: (106-153)/(56-99) 146/99  SpO2:  [94 %-99 %] 97 %  Body mass index is 36 49 kg/m²  Input and Output Summary (last 24 hours): Intake/Output Summary (Last 24 hours) at 2020 1433  Last data filed at 2020 1023  Gross per 24 hour   Intake 1000 ml   Output    Net 1000 ml       Physical Exam:     Physical Exam  Vitals signs reviewed  Constitutional:       Appearance: Normal appearance  HENT:      Head: Normocephalic and atraumatic  Nose: Nose normal    Eyes:      General: No scleral icterus  Right eye: No discharge  Left eye: No discharge  Extraocular Movements: Extraocular movements intact  Neck:      Musculoskeletal: Normal range of motion  Cardiovascular:      Rate and Rhythm: Normal rate and regular rhythm  Pulses: Normal pulses  Pulmonary:      Effort: Pulmonary effort is normal  No respiratory distress        Breath sounds: Normal breath sounds  Abdominal:      General: Bowel sounds are normal  There is no distension  Palpations: Abdomen is soft  Tenderness: There is no abdominal tenderness  Musculoskeletal: Normal range of motion  Comments: Mildly swollen index finger of left hand  No erythema, edema  Patient refuses to check forrange of movement due to pain    Skin:     General: Skin is warm and dry  Neurological:      General: No focal deficit present  Mental Status: He is alert and oriented to person, place, and time  Comments: Alert and oriented x 3 to person, place, time  No cranial nerve defects  Motor 5/5 in right upper extremity and right lower extremity  Motor 4/5 in left upper extremity and left lower extremity      Psychiatric:         Mood and Affect: Mood normal          Behavior: Behavior normal              Additional Data:     Labs:    Results from last 7 days   Lab Units 09/10/20  1105   WBC Thousand/uL 8 48   HEMOGLOBIN g/dL 18 0*   HEMATOCRIT % 53 7*   PLATELETS Thousands/uL 349   NEUTROS PCT % 55   LYMPHS PCT % 37   MONOS PCT % 6   EOS PCT % 2     Results from last 7 days   Lab Units 09/10/20  1206   POTASSIUM mmol/L 4 2   CHLORIDE mmol/L 101   CO2 mmol/L 24   BUN mg/dL 13   CREATININE mg/dL 1 18   CALCIUM mg/dL 9 2   ALK PHOS U/L 79   ALT U/L 43   AST U/L 19     Results from last 7 days   Lab Units 09/10/20  1105   INR  0 99       * I Have Reviewed All Lab Data Listed Above  * Additional Pertinent Lab Tests Reviewed:  Carlos 68 Admission Reviewed    Imaging:    Imaging Reports Reviewed Today Include: CT head and neck with and without contrast, CT chest abdomen pelvis with and without contrast  Imaging Personally Reviewed by Myself Includes:    X-ray hand two views, MRI brain without contrast, CT head and neck with and without contrast, CT chest abdomen pelvis with and without contrast    Recent Cultures (last 7 days):           Last 24 Hours Medication List: Current Facility-Administered Medications   Medication Dose Route Frequency Provider Last Rate    acetaminophen  650 mg Oral Q6H PRN Abner Vazquez MD      aspirin  81 mg Oral Daily Vitaliy NELSON MD      atorvastatin  40 mg Oral QPM Vitaliy NELSON MD      diphenhydrAMINE  25 mg Intravenous Q8H PRN Vitaliy NELSON MD      enoxaparin  40 mg Subcutaneous Daily Vitaliy NELSON MD      HYDROmorphone  0 2 mg Intravenous Q4H PRN Edie NELSON MD      ketorolac  30 mg Intravenous Q8H [facility], PA-C      magnesium sulfate  1 g Intravenous BID [facility], PA-C      metoclopramide  10 mg Intravenous Q8H [address] Vitaliy NELSON MD      oxyCODONE  5 mg Oral Q6H PRN Abner Vazquez MD      pantoprazole  40 mg Oral Early Morning Arik Andrew MD      predniSONE  40 mg Oral Daily Arik Andrew MD      sodium chloride  75 mL/hr Intravenous Continuous Cindy Cheung MD 75 mL/hr (09/11/20 1023)        Today, Patient Was Seen By: Arik Andrew MD    ** Please Note: This note has been constructed using a voice recognition system   **

## 2020-09-11 NOTE — PLAN OF CARE
Problem: Potential for Falls  Goal: Patient will remain free of falls  Description: INTERVENTIONS:  - Assess patient frequently for physical needs  -  Identify cognitive and physical deficits and behaviors that affect risk of falls    -  [place] fall precautions as indicated by assessment   - Educate patient/family on patient safety including physical limitations  - Instruct patient to call for assistance with activity based on assessment  - Modify environment to reduce risk of injury  - Consider OT/PT consult to assist with strengthening/mobility  9/11/2020 1550 by Nikkie Ortiz RN  Outcome: Progressing  9/11/2020 1114 by Nikkie Ortiz RN  Outcome: Progressing     Problem: Prexisting or High Potential for Compromised Skin Integrity  Goal: Skin integrity is maintained or improved  Description: INTERVENTIONS:  - Identify patients at risk for skin breakdown  - Assess and monitor skin integrity  - Assess and monitor nutrition and hydration status  - Monitor labs   - Assess for incontinence   - Turn and reposition patient  - Assist with mobility/ambulation  - Relieve pressure over bony prominences  - Avoid friction and shearing  - Provide appropriate hygiene as needed including keeping skin clean and dry  - Evaluate need for skin moisturizer/barrier cream  - Collaborate with interdisciplinary team   - Patient/family teaching  - Consider wound care consult   9/11/2020 1550 by Nikkie Ortiz RN  Outcome: Progressing  9/11/2020 1114 by Nikkie Ortzi RN  Outcome: Progressing     Problem: NEUROSENSORY - ADULT  Goal: Achieves stable or improved neurological status  Description: INTERVENTIONS  - Monitor and report changes in neurological status  - Monitor vital signs such as temperature, blood pressure, glucose, and any other labs ordered   - Initiate measures to prevent increased intracranial pressure  - Monitor for seizure activity and implement precautions if appropriate      9/11/2020 1550 by Nikkie Ortiz RN  Outcome: Progressing  9/11/2020 1114 by Crystal Santana RN  Outcome: Progressing  Goal: Achieves maximal functionality and self care  Description: INTERVENTIONS  - Monitor swallowing and airway patency with patient fatigue and changes in neurological status  - Encourage and assist patient to increase activity and self care  - Encourage visually impaired, hearing impaired and aphasic patients to use assistive/communication devices  9/11/2020 1550 by Crystal Santana RN  Outcome: Progressing  9/11/2020 1114 by Crystal Santana RN  Outcome: Progressing     Problem: Neurological Deficit  Goal: Neurological status is stable or improving  Description: Interventions:  - Monitor and assess patient's level of consciousness, motor function, sensory function, and level of assistance needed for ADLs  - Monitor and report changes from baseline  Collaborate with interdisciplinary team to initiate plan and implement interventions as ordered  - Provide and maintain a safe environment  - Consider seizure precautions  - Consider fall precautions  - Consider aspiration precautions  - Consider bleeding precautions  9/11/2020 1550 by Crystal Santana RN  Outcome: Progressing  9/11/2020 1114 by Crystal Santana RN  Outcome: Progressing     Problem: Activity Intolerance/Impaired Mobility  Goal: Mobility/activity is maintained at optimum level for patient  Description: Interventions:  - Assess and monitor patient  barriers to mobility and need for assistive/adaptive devices  - Assess patient's emotional response to limitations  - Collaborate with interdisciplinary team and initiate plans and interventions as ordered  - Encourage independent activity per ability   - Maintain proper body alignment  - Perform active/passive rom as tolerated/ordered    - Plan activities to conserve energy   - Turn patient as appropriate  9/11/2020 1550 by Crystal Santana RN  Outcome: Progressing  9/11/2020 1114 by Crystal Santana RN  Outcome: Progressing     Problem: Communication Impairment  Goal: Ability to express needs and understand communication  Description: Assess patient's communication skills and ability to understand information  Patient will demonstrate use of effective communication techniques, alternative methods of communication and understanding even if not able to speak  - Encourage communication and provide alternate methods of communication as needed  - Collaborate with case management/ for discharge needs  - Include patient/family/caregiver in decisions related to communication  9/11/2020 1550 by Deonte Macdonald RN  Outcome: Progressing  9/11/2020 1114 by Deonte Macdonald RN  Outcome: Progressing     Problem: Potential for Aspiration  Goal: Non-ventilated patient's risk of aspiration is minimized  Description: Assess and monitor vital signs, respiratory status, and labs (WBC)  Monitor for signs of aspiration (tachypnea, cough, rales, wheezing, cyanosis, fever)  - Assess and monitor patient's ability to swallow  - Place patient up in chair to eat if possible  - HOB up at 90 degrees to eat if unable to get patient up into chair   - Supervise patient during oral intake  - Instruct patient/ family to take small bites  - Instruct patient/ family to take small single sips when taking liquids  - Follow patient-specific strategies generated by speech pathologist   9/11/2020 1550 by Deonte Macdonald RN  Outcome: Progressing  9/11/2020 1114 by Deonte Macdonald RN  Outcome: Progressing  Goal: Ventilated patient's risk of aspiration is minimized  Description: Assess and monitor vital signs, respiratory status, airway cuff pressure, and labs (WBC)  Monitor for signs of aspiration (tachypnea, cough, rales, wheezing, cyanosis, fever)  - Elevate head of bed 30 degrees if patient has tube feeding   - Monitor tube feeding    9/11/2020 1550 by Deonte Macdonald RN  Outcome: Progressing  9/11/2020 1114 by Deonte Macdonald RN  Outcome: Progressing     Problem: Nutrition  Goal: Nutrition/Hydration status is improving  Description: Monitor and assess patient's nutrition/hydration status for malnutrition (ex- brittle hair, bruises, dry skin, pale skin and conjunctiva, muscle wasting, smooth red tongue, and disorientation)  Collaborate with interdisciplinary team and initiate plan and interventions as ordered  Monitor patient's weight and dietary intake as ordered or per policy  Utilize nutrition screening tool and intervene per policy  Determine patient's food preferences and provide high-protein, high-caloric foods as appropriate  - Assist patient with eating   - Allow adequate time for meals   - Encourage patient to take dietary supplement as ordered  - Collaborate with clinical nutritionist   - Include patient/family/caregiver in decisions related to nutrition    9/11/2020 1550 by Lesa Jolley RN  Outcome: Progressing  9/11/2020 1114 by Lesa Jolley RN  Outcome: Progressing

## 2020-09-11 NOTE — PHYSICAL THERAPY NOTE
Physical Therapy Evaluation     Patient's Name: Laurence Cooley    Admitting Diagnosis  Blurred vision [H53 8]  Chest pain [R07 9]  Left arm weakness [R29 898]  Blurred vision, bilateral [H53 8]  Headache [R51]  Weakness of both lower extremities [R29 898]    Problem List  Patient Active Problem List   Diagnosis    Headache    Chest pain    Weakness of left lower extremity and sensory changes    Finger pain, left    Hypertriglyceridemia       Past Medical History  History reviewed  No pertinent past medical history  Past Surgical History  History reviewed  No pertinent surgical history  09/11/20 0851   PT Last Visit   PT Visit Date 09/11/20   Note Type   Note type Eval only   Pain Assessment   Pain Assessment Tool 0-10   Pain Location/Orientation Location: Head;Orientation: Left; Location: Arm;Location: Leg   Home Living   Type of [address] Multi-level;Bed/bath upstairs  (split level; 3 HOUSTON; 5 steps upstairs, 5 steps downstairs)   Bathroom Shower/Tub Tub/shower unit   Bathroom Toilet Standard   Bathroom Equipment Grab bars in shower   [address]   (no DME at baseline)   Prior Function   Level of [place] Independent with ADLs and functional mobility   Lives With Spouse; Navdeep Mora Help From Family   ADL Assistance Independent   IADLs Independent  (spouse mostly completes meal prep, cleaning, laundry)   Falls in the last 6 months 0   Vocational Full time employment  ()   Comments pt able to communicate & make needs known in [address]    Restrictions/Precautions   Weight Bearing Precautions Per Order No   Braces or Orthoses   (none per pt)   Other Precautions Chair Alarm; Bed Alarm;Multiple lines;Telemetry; Fall Risk;Pain   General   Family/Caregiver Present Yes   Cognition   Overall Cognitive Status WFL   Attention Within functional limits   Orientation Level Oriented X4   Memory Within functional limits   Following Commands Follows all commands and directions without difficulty   RUE Assessment   RUE Assessment X  (defer to OT eval for comments)   LUE Assessment   LUE Assessment X  (defer to OT eval for comments)   RLE Assessment   RLE Assessment WFL  (5/5)   LLE Assessment   LLE Assessment X   Strength LLE   L Hip Flexion 3-/5   L Knee Extension 2+/5   L Ankle Dorsiflexion 2/5   L Ankle Plantar Flexion 3+/5   Coordination   Sensation X   Light Touch   RLE Light Touch Grossly intact   LLE Light Touch Impaired   Bed Mobility   Additional Comments pt received seated at EOB upo arrival   Transfers   Sit to Stand 4  Minimal assistance   Additional items Assist x 1; Increased time required;Verbal cues   Stand to Sit 4  Minimal assistance   Additional items Assist x 1; Increased time required;Verbal cues   Ambulation/Elevation   Gait pattern Decreased foot clearance; Inconsistent sangita;Decreased L stance; Short stride; Step to;Excessively slow   Gait Assistance 4  Minimal assist   Additional items Assist x 1;Verbal cues; Tactile cues   Assistive Device Rolling walker   Distance 10' x2   Stair Management Assistance Not tested   Balance   Static Sitting Fair +   Dynamic Sitting Fair   Static Standing Poor +   Dynamic Standing Poor +   Ambulatory Poor +   Endurance Deficit   Endurance Deficit Yes   Activity Tolerance   Activity Tolerance Patient limited by fatigue;Patient limited by pain   Medical Staff Made Aware OT Scott Garner made aware of outcomes   Assessment   Prognosis Good   Problem List Decreased strength;Decreased endurance; Impaired balance;Decreased mobility; Impaired sensation;Pain; Impaired vision   Assessment Pt is 39 y o  male seen for PT evaluation on 9/11/2020 s/p admit to [facility] on 9/10/2020 w/ Headache  PT consulted to assess pt's functional mobility and d/c needs  Order placed for PT eval and tx, w/ up and OOB as tolerated order   Performed at least 2 patient identifiers during session: Name and wristband  PTA, pt was independent w/ all functional mobility w/ no AD/DME, ambulates unrestricted distances and all terrain, has 3 [address]ST[address], lives w/ wife and family in multi level mesfin and works full time  Personal factors affecting pt at time of IE include: inaccessible home environment, ambulating w/ assistive device, stairs to enter home, inability to navigate community distances, limited home support, preferred language not [place] (language barrier) and decreased initiation and engagement  Please find objective findings from PT assessment regarding body systems outlined above with impairments and limitations including weakness, impaired balance, gait deviations, pain, decreased activity tolerance, altered sensation and fall risk, as well as mobility assessment (need for min assist w/ all phases of mobility when usually ambulating independently and need for cueing for mobility technique)  The following objective measures performed on IE also reveal limitations: Barthel Index: 45/100 and Modified [place]: 4 (moderate/severe disability)  Pt's clinical presentation is currently unstable/unpredictable seen in pt's presentation of abnormal lab value(s), need for input for task focus and mobility technique, on telemetry monitoring and ongoing medical assessment  Pt to benefit from continued PT tx to address deficits as defined above and maximize level of functional independent mobility and consistency  From PT/mobility standpoint, recommendation at time of d/c would be STR pending progress in order to facilitate return to PLOF     Barriers to Discharge Inaccessible home environment;Decreased caregiver support   Goals   Patient Goals "I want to get back to work"   STG Expiration Date 09/21/20   Short Term Goal #1 In 7-10 days: Increase L LE strength 1/2 grade to facilitate independent mobility, Perform all bed mobility tasks modified independent to decrease caregiver burden, Perform all transfers modified independent to improve independence, Ambulate > 150 ft  with least restrictive assistive device modified independent w/o LOB and w/ normalized gait pattern 100% of the time, Navigate 5+5 stairs modified independent with unilateral handrail to facilitate return to previous living environment, Increase all balance 1/2 grade to decrease risk for falls, Improve Barthel Index score to 60 or greater to facilitate independence and PT provider will perform functional balance assessment to determine fall risk   PT Treatment Day 0   Plan   Treatment/Interventions Functional transfer training;LE strengthening/ROM; Elevations; Therapeutic exercise; Endurance training;Patient/family training;Equipment eval/education; Bed mobility;Gait training;Spoke to nursing;OT   PT Frequency 5x/wk  (3-5x/wk)   Recommendation   PT Discharge Recommendation Post-Acute Rehabilitation Services   Equipment Recommended   (TBD)   PT - OK to Discharge Yes  (when medically cleared if to STR)   Modified Berks Scale   Modified Berks Scale 4   Barthel Index   Feeding 5   Bathing 0   Grooming Score 0   Dressing Score 5   Bladder Score 10   Bowels Score 10   Toilet Use Score 5   Transfers (Bed/Chair) Score 10   Mobility (Level Surface) Score 0   Stairs Score 0   Barthel Index Score 45         Karla Gomez, PT, DPT

## 2020-09-11 NOTE — ASSESSMENT & PLAN NOTE
-complaint of excruciating headache, associated with blurry vision, chest pain, left arm and leg weakness  -CT head and neck negative for acute findings  -CT chest abdomen pelvis negative for acute findings  -neurology on board  -MRI ordered  -echo ordered  -speech eval pending    -per neurology recommendations, patient started on aspirin 81 mg daily, atorvastatin 40 mg daily, magnesium  -labetalol if SBP over 200  -frequent neuro checks  -follow-up results of MRI and echo  -continuing pain management

## 2020-09-11 NOTE — ASSESSMENT & PLAN NOTE
-troponin X 4 were normal  -BMP mildly elevated at 129  -CTA chest abdomen and pelvis had no acute finding  -EKG in the ED was normal  -not complaining of any chest pain at time of examination  -triglycerides elevated at 354  -A1c of 5 9   -ASCVD:  3 9%    -echo pending  -Continue tele monitoring

## 2020-09-11 NOTE — ASSESSMENT & PLAN NOTE
CTA head and neck unremarkable  MRI brain negative for acute infarction   Symptoms likely secondary to complex migraine    - Can discontinue stroke pathway   - Does not need to continue on aspirin and statin from neurologic standpoint    - PT/OT

## 2020-09-12 VITALS
TEMPERATURE: 97.5 F | SYSTOLIC BLOOD PRESSURE: 105 MMHG | WEIGHT: 240 LBS | DIASTOLIC BLOOD PRESSURE: 54 MMHG | HEART RATE: 67 BPM | HEIGHT: 68 IN | BODY MASS INDEX: 36.37 KG/M2 | OXYGEN SATURATION: 96 % | RESPIRATION RATE: 18 BRPM

## 2020-09-12 LAB
BASOPHILS # BLD AUTO: 0.02 THOUSANDS/ΜL (ref 0–0.1)
BASOPHILS NFR BLD AUTO: 0 % (ref 0–1)
EOSINOPHIL # BLD AUTO: 0.01 THOUSAND/ΜL (ref 0–0.61)
EOSINOPHIL NFR BLD AUTO: 0 % (ref 0–6)
ERYTHROCYTE [DISTWIDTH] IN BLOOD BY AUTOMATED COUNT: 12.4 % (ref 11.6–15.1)
HCT VFR BLD AUTO: 42.5 % (ref 36.5–49.3)
HGB BLD-MCNC: 14 G/DL (ref 12–17)
IMM GRANULOCYTES # BLD AUTO: 0.03 THOUSAND/UL (ref 0–0.2)
IMM GRANULOCYTES NFR BLD AUTO: 0 % (ref 0–2)
LYMPHOCYTES # BLD AUTO: 1.68 THOUSANDS/ΜL (ref 0.6–4.47)
LYMPHOCYTES NFR BLD AUTO: 16 % (ref 14–44)
MCH RBC QN AUTO: 29.8 PG (ref 26.8–34.3)
MCHC RBC AUTO-ENTMCNC: 32.7 G/DL (ref 31.4–37.4)
MCV RBC AUTO: 91 FL (ref 82–98)
MONOCYTES # BLD AUTO: 0.7 THOUSAND/ΜL (ref 0.17–1.22)
MONOCYTES NFR BLD AUTO: 7 % (ref 4–12)
NEUTROPHILS # BLD AUTO: 8.17 THOUSANDS/ΜL (ref 1.85–7.62)
NEUTS SEG NFR BLD AUTO: 77 % (ref 43–75)
NRBC BLD AUTO-RTO: 0 /100 WBCS
PLATELET # BLD AUTO: 283 THOUSANDS/UL (ref 149–390)
PMV BLD AUTO: 9.4 FL (ref 8.9–12.7)
RBC # BLD AUTO: 4.66 MILLION/UL (ref 3.88–5.62)
WBC # BLD AUTO: 10.61 THOUSAND/UL (ref 4.31–10.16)

## 2020-09-12 PROCEDURE — 99217 PR OBSERVATION CARE DISCHARGE MANAGEMENT: CPT | Performed by: STUDENT IN AN ORGANIZED HEALTH CARE EDUCATION/TRAINING PROGRAM

## 2020-09-12 PROCEDURE — 99225 PR SBSQ OBSERVATION CARE/DAY 25 MINUTES: CPT | Performed by: PHYSICIAN ASSISTANT

## 2020-09-12 PROCEDURE — 85025 COMPLETE CBC W/AUTO DIFF WBC: CPT | Performed by: INTERNAL MEDICINE

## 2020-09-12 RX ORDER — ACETAMINOPHEN 325 MG/1
650 TABLET ORAL EVERY 6 HOURS PRN
Qty: 30 TABLET | Refills: 0 | Status: SHIPPED | OUTPATIENT
Start: 2020-09-12

## 2020-09-12 RX ORDER — IBUPROFEN 200 MG
400 TABLET ORAL EVERY 6 HOURS PRN
Qty: 30 TABLET | Refills: 0 | Status: SHIPPED | OUTPATIENT
Start: 2020-09-12

## 2020-09-12 RX ADMIN — METOCLOPRAMIDE HYDROCHLORIDE 10 MG: 5 INJECTION INTRAMUSCULAR; INTRAVENOUS at 05:24

## 2020-09-12 RX ADMIN — KETOROLAC TROMETHAMINE 30 MG: 30 INJECTION, SOLUTION INTRAMUSCULAR at 08:45

## 2020-09-12 RX ADMIN — PREDNISONE 40 MG: 20 TABLET ORAL at 08:44

## 2020-09-12 RX ADMIN — ENOXAPARIN SODIUM 40 MG: 40 INJECTION SUBCUTANEOUS at 08:44

## 2020-09-12 RX ADMIN — ASPIRIN 81 MG 81 MG: 81 TABLET ORAL at 08:45

## 2020-09-12 RX ADMIN — PANTOPRAZOLE SODIUM 40 MG: 40 TABLET, DELAYED RELEASE ORAL at 05:24

## 2020-09-12 RX ADMIN — MAGNESIUM SULFATE HEPTAHYDRATE 1 G: 1 INJECTION, SOLUTION INTRAVENOUS at 09:17

## 2020-09-12 RX ADMIN — KETOROLAC TROMETHAMINE 30 MG: 30 INJECTION, SOLUTION INTRAMUSCULAR at 02:37

## 2020-09-12 RX ADMIN — SODIUM CHLORIDE 75 ML/HR: 0.9 INJECTION, SOLUTION INTRAVENOUS at 04:37

## 2020-09-12 NOTE — PROGRESS NOTES
Progress Note - Neurology   Tiago Andres 39 y o  male [id number]  Unit/Bed#: /-01    Assessment/Plan:  Tiago Andres is a 39 y o  male, who presented with a severe right temporal headache with left-sided sensory and motor deficits  MRI negative for acute infarct, despite persistence in focal neurologic symptoms  Suspect complex migraine  * Headache  Assessment & Plan  - Current headache regimen:   · Toradol 30 mg Q8h scheduled  · Reglan 10 mg Q8h scheduled   · Benadryl 25 mg Q8h scheduled   · Magnesium 1g BID x 2 days   - Medical management and supportive care per primary team  Correction of any metabolic or infectious disturbances  - As headache has resolved, patient is stable for discharge from a neurologic standpoint        Weakness of left lower extremity and sensory changes  Assessment & Plan  CTA head and neck unremarkable  MRI brain negative for acute infarction  Symptoms likely secondary to complex migraine    - Can discontinue stroke pathway   - Does not need to continue on aspirin and statin from neurologic standpoint    - PT/OT          Subjective: The patient reports that his headache has entirely resolved  His strength and sensory deficits are significantly improved, but are still present to a lesser degree  He was able to ambulate without difficulty today  He denies any visual disturbances, difficulty swallowing, and difficulty speaking  History reviewed  No pertinent past medical history  History reviewed  No pertinent surgical history  History reviewed  No pertinent family history       Social History     Socioeconomic History    Marital status: /Civil Union     Spouse name: None    Number of children: None    Years of education: None    Highest education level: None   Occupational History    None   Social Needs    Financial resource strain: None    Food insecurity     Worry: None     Inability: None    Transportation needs Medical: None     Non-medical: None   Tobacco Use    Smoking status: Never Smoker    Smokeless tobacco: Never Used   Substance and Sexual Activity    Alcohol use: Yes     Frequency: 2-4 times a month     Drinks per session: 1 or 2    Drug use: Never    Sexual activity: None   Lifestyle    Physical activity     Days per week: None     Minutes per session: None    Stress: None   Relationships    Social connections     Talks on phone: None     Gets together: None     Attends Faith service: None     Active member of club or organization: None     Attends meetings of clubs or organizations: None     Relationship status: None    Intimate partner violence     Fear of current or ex partner: None     Emotionally abused: None     Physically abused: None     Forced sexual activity: None   Other Topics Concern    None   Social History Narrative    None         Medications:   All current active meds have been reviewed and current meds:  Scheduled Meds:  Current Facility-Administered Medications   Medication Dose Route Frequency Provider Last Rate    acetaminophen  650 mg Oral Q6H PRN Manuel Heath MD      aspirin  81 mg Oral Daily Vitaliy NELSON MD      atorvastatin  40 mg Oral QPM Vitaliy NELSON MD      diphenhydrAMINE  25 mg Intravenous Q8H PRN Vitaliy NELSON MD      enoxaparin  40 mg Subcutaneous Daily Vitaliy NELSON MD      HYDROmorphone  0 2 mg Intravenous Q4H PRN Ramiro NELSON MD      ketorolac  30 mg Intravenous Q8H Abby Macario PA-C      magnesium sulfate  1 g Intravenous BID Abby Macario PA-C      metoclopramide  10 mg Intravenous Q8H [address] Vitaliy San MD      oxyCODONE  5 mg Oral Q6H PRN Manuel Heath MD      pantoprazole  40 mg Oral Early Morning Nura Shirley MD      predniSONE  40 mg Oral Daily Nura Shirley MD      sodium chloride  75 mL/hr Intravenous Continuous Cindy Cheung MD 75 mL/hr (09/12/20 7673)     Continuous Infusions:sodium chloride, 75 mL/hr, Last Rate: 75 mL/hr (09/12/20 3511)      PRN Meds:   acetaminophen    diphenhydrAMINE    HYDROmorphone    oxyCODONE       ROS:   Review of Systems   Neurological: Positive for weakness and numbness  Headache resolved   All other systems reviewed and are negative  Vitals:   /54 (BP Location: Left arm)   Pulse 67   Temp 97 5 °F (36 4 °C) (Oral)   Resp 18   Ht 5' 8" (1 727 m)   Wt 109 kg (240 lb)   SpO2 96%   BMI 36 49 kg/m²     Physical Exam:   Physical Exam  Vitals signs and nursing note reviewed  Constitutional:       General: He is not in acute distress  Appearance: Normal appearance  He is well-developed  He is not diaphoretic  Comments: Resting comfortably in bed  HENT:      Head: Normocephalic and atraumatic  Right Ear: External ear normal       Left Ear: External ear normal       Nose: Nose normal       Mouth/Throat:      Mouth: Mucous membranes are moist       Pharynx: Oropharynx is clear  Eyes:      General: No scleral icterus  Right eye: No discharge  Left eye: No discharge  Extraocular Movements: Extraocular movements intact and EOM normal       Conjunctiva/sclera: Conjunctivae normal       Pupils: Pupils are equal, round, and reactive to light  Neck:      Musculoskeletal: Normal range of motion and neck supple  Cardiovascular:      Rate and Rhythm: Normal rate  Pulmonary:      Effort: Pulmonary effort is normal  No respiratory distress  Musculoskeletal: Normal range of motion  General: No deformity  Right lower leg: No edema  Left lower leg: No edema  Skin:     General: Skin is warm and dry  Findings: No erythema or rash  Neurological:      Mental Status: He is alert and oriented to person, place, and time  Cranial Nerves: No cranial nerve deficit  Sensory: No sensory deficit  Motor: Weakness present        Coordination: Coordination normal  Finger-Nose-Finger Test normal       Gait: Gait is intact  Gait normal    Psychiatric:         Speech: Speech normal          Behavior: Behavior normal          Thought Content: Thought content normal          Judgment: Judgment normal        Neurologic Exam     Mental Status   Oriented to person, place, and time  Speech: speech is normal   Level of consciousness: alert  Follows all commands  Cranial Nerves     CN II   Visual fields full to confrontation  CN III, IV, VI   Pupils are equal, round, and reactive to light  Extraocular motions are normal    Right pupil: Shape: regular  Left pupil: Shape: regular  Upgaze: normal  Downgaze: normal  Conjugate gaze: present    CN V   Facial sensation intact  CN VII   Facial expression full, symmetric  CN VIII   Hearing: intact    CN IX, X   Palate: symmetric    CN XI   Right sternocleidomastoid strength: normal  Left sternocleidomastoid strength: normal    CN XII   Tongue: not atrophic  Fasciculations: absent  Tongue deviation: none    Motor Exam     Strength   Strength 5/5 except as noted  L  4+/5     Sensory Exam   Light touch normal    Vibration normal    Pinprick normal      Gait, Coordination, and Reflexes     Gait  Gait: normal    Coordination   Finger to nose coordination: normal    Tremor   Resting tremor: absent  Intention tremor: absent  Action tremor: absent          Labs: I have personally reviewed pertinent reports     Recent Results (from the past 24 hour(s))   CBC and differential    Collection Time: 09/12/20  5:23 AM   Result Value Ref Range    WBC 10 61 (H) 4 31 - 10 16 Thousand/uL    RBC 4 66 3 88 - 5 62 Million/uL    Hemoglobin 14 0 12 0 - 17 0 g/dL    Hematocrit 42 5 36 5 - 49 3 %    MCV 91 82 - 98 fL    MCH 29 8 26 8 - 34 3 pg    MCHC 32 7 31 4 - 37 4 g/dL    RDW 12 4 11 6 - 15 1 %    MPV 9 4 8 9 - 12 7 fL    Platelets 757 421 - 792 Thousands/uL    nRBC 0 /100 WBCs    Neutrophils Relative 77 (H) 43 - 75 %    Immat GRANS % 0 0 - 2 %    Lymphocytes Relative 16 14 - 44 %    Monocytes Relative 7 4 - 12 %    Eosinophils Relative 0 0 - 6 %    Basophils Relative 0 0 - 1 %    Neutrophils Absolute 8 17 (H) 1 85 - 7 62 Thousands/µL    Immature Grans Absolute 0 03 0 00 - 0 20 Thousand/uL    Lymphocytes Absolute 1 68 0 60 - 4 47 Thousands/µL    Monocytes Absolute 0 70 0 17 - 1 22 Thousand/µL    Eosinophils Absolute 0 01 0 00 - 0 61 Thousand/µL    Basophils Absolute 0 02 0 00 - 0 10 Thousands/µL       Imaging: I have personally reviewed pertinent imaging in PACS, including CTA, MRI,  and I have personally reviewed PACS reports  EKG, Pathology, and Other Studies: I have personally reviewed pertinent reports  VTE Prophylaxis: Enoxaparin (Lovenox)      Counseling / Coordination of Care  Total time spent today 26 minutes  Greater than 50% of total time was spent with the patient and/or family counseling and/or coordination of care  A description of the counseling/coordination of care:  Patient was seen and evaluated  Discussed likely etiology, current medications, and side effects  Chart reviewed thoroughly including laboratory and imaging studies    Plan of care discussed with attending neurologist and primary team

## 2020-09-12 NOTE — PLAN OF CARE
Problem: Potential for Falls  Goal: Patient will remain free of falls  Description: INTERVENTIONS:  - Assess patient frequently for physical needs  -  Identify cognitive and physical deficits and behaviors that affect risk of falls    -  [place] fall precautions as indicated by assessment   - Educate patient/family on patient safety including physical limitations  - Instruct patient to call for assistance with activity based on assessment  - Modify environment to reduce risk of injury  - Consider OT/PT consult to assist with strengthening/mobility  Outcome: Progressing     Problem: Prexisting or High Potential for Compromised Skin Integrity  Goal: Skin integrity is maintained or improved  Description: INTERVENTIONS:  - Identify patients at risk for skin breakdown  - Assess and monitor skin integrity  - Assess and monitor nutrition and hydration status  - Monitor labs   - Assess for incontinence   - Turn and reposition patient  - Assist with mobility/ambulation  - Relieve pressure over bony prominences  - Avoid friction and shearing  - Provide appropriate hygiene as needed including keeping skin clean and dry  - Evaluate need for skin moisturizer/barrier cream  - Collaborate with interdisciplinary team   - Patient/family teaching  - Consider wound care consult   Outcome: Progressing     Problem: NEUROSENSORY - ADULT  Goal: Achieves stable or improved neurological status  Description: INTERVENTIONS  - Monitor and report changes in neurological status  - Monitor vital signs such as temperature, blood pressure, glucose, and any other labs ordered   - Initiate measures to prevent increased intracranial pressure  - Monitor for seizure activity and implement precautions if appropriate      Outcome: Progressing  Goal: Achieves maximal functionality and self care  Description: INTERVENTIONS  - Monitor swallowing and airway patency with patient fatigue and changes in neurological status  - Encourage and assist patient to increase activity and self care  - Encourage visually impaired, hearing impaired and aphasic patients to use assistive/communication devices  Outcome: Progressing     Problem: Neurological Deficit  Goal: Neurological status is stable or improving  Description: Interventions:  - Monitor and assess patient's level of consciousness, motor function, sensory function, and level of assistance needed for ADLs  - Monitor and report changes from baseline  Collaborate with interdisciplinary team to initiate plan and implement interventions as ordered  - Provide and maintain a safe environment  - Consider seizure precautions  - Consider fall precautions  - Consider aspiration precautions  - Consider bleeding precautions  Outcome: Progressing     Problem: Activity Intolerance/Impaired Mobility  Goal: Mobility/activity is maintained at optimum level for patient  Description: Interventions:  - Assess and monitor patient  barriers to mobility and need for assistive/adaptive devices  - Assess patient's emotional response to limitations  - Collaborate with interdisciplinary team and initiate plans and interventions as ordered  - Encourage independent activity per ability   - Maintain proper body alignment  - Perform active/passive rom as tolerated/ordered  - Plan activities to conserve energy   - Turn patient as appropriate  Outcome: Progressing     Problem: Communication Impairment  Goal: Ability to express needs and understand communication  Description: Assess patient's communication skills and ability to understand information  Patient will demonstrate use of effective communication techniques, alternative methods of communication and understanding even if not able to speak  - Encourage communication and provide alternate methods of communication as needed  - Collaborate with case management/ for discharge needs    - Include patient/family/caregiver in decisions related to communication  Outcome: Progressing     Problem: Potential for Aspiration  Goal: Non-ventilated patient's risk of aspiration is minimized  Description: Assess and monitor vital signs, respiratory status, and labs (WBC)  Monitor for signs of aspiration (tachypnea, cough, rales, wheezing, cyanosis, fever)  - Assess and monitor patient's ability to swallow  - Place patient up in chair to eat if possible  - HOB up at 90 degrees to eat if unable to get patient up into chair   - Supervise patient during oral intake  - Instruct patient/ family to take small bites  - Instruct patient/ family to take small single sips when taking liquids  - Follow patient-specific strategies generated by speech pathologist   Outcome: Progressing  Goal: Ventilated patient's risk of aspiration is minimized  Description: Assess and monitor vital signs, respiratory status, airway cuff pressure, and labs (WBC)  Monitor for signs of aspiration (tachypnea, cough, rales, wheezing, cyanosis, fever)  - Elevate head of bed 30 degrees if patient has tube feeding   - Monitor tube feeding  Outcome: Progressing     Problem: Nutrition  Goal: Nutrition/Hydration status is improving  Description: Monitor and assess patient's nutrition/hydration status for malnutrition (ex- brittle hair, bruises, dry skin, pale skin and conjunctiva, muscle wasting, smooth red tongue, and disorientation)  Collaborate with interdisciplinary team and initiate plan and interventions as ordered  Monitor patient's weight and dietary intake as ordered or per policy  Utilize nutrition screening tool and intervene per policy  Determine patient's food preferences and provide high-protein, high-caloric foods as appropriate  - Assist patient with eating   - Allow adequate time for meals   - Encourage patient to take dietary supplement as ordered  - Collaborate with clinical nutritionist   - Include patient/family/caregiver in decisions related to nutrition    Outcome: Progressing

## 2020-09-12 NOTE — CASE MANAGEMENT
Pt being discharged today and needs a PCP appointment  An appointment was set up with Dr Shira Zambrano for Monday, 9/14/20 at 14:00

## 2020-09-12 NOTE — DISCHARGE SUMMARY
Discharge- Mathis Dakin 1975, 39 y o  male MRN: [id number]    Unit/Bed#: -01 Encounter: [id number]    Primary Care Provider: No primary care provider on file  Date and time admitted to hospital: 9/10/2020 10:51 AM        * Headache  Assessment & Plan  -complaint of excruciating headache, associated with blurry vision, chest pain, left arm and leg weakness  -CTA head and neck negative for acute findings  -CTA chest abdomen pelvis negative for acute findings, note for hepatic steatosis  -MRI brain noted negative for acute findings,  scattered tiny supratentorial white matter foci likely representing mild chronic microangiopathic changes including the types which can accompany migraine headaches     Currently afebrile, hemodynamically stable  Seen ambulating in a hallway  Currently patient reports headache has completely resolved  Patient was seen by neurology and his symptoms totally due to complex migraine headache  Recommendations from neurology is to discharge home on Tylenol and NSAIDs for headache and outpatient follow-up with headache clinic  Currently patient reports feeling much better and eager to go home  Recommend close follow-up with primary care provider:[facility] to provide community resources  Recommend close follow-up with Neurology and headache clinic  Hypertriglyceridemia  Assessment & Plan  -elevated triglycerides at 354  -HDL low at 30  -10 year cardiovascular risk 3 1 %  Recommended lifestyle and dietary modification  Recommended repeat lipid panel outpatient with primary care provider  Finger pain, left  Assessment & Plan  Hand x-ray pulmonary negative for acute finding  Currently much better  Recommend close out pt Follow up and out pt PT  Weakness of left lower extremity and sensory changes  Assessment & Plan  Likely secondary to complex migraine headache  Now resolved  Currently seen ambulating in the hallway      Chest pain  Assessment & Plan  -troponin X 4 were normal  -BMP mildly elevated at 129  -CTA chest abdomen and pelvis had no acute finding  -EKG in the ED was normal  Troponin negative x4  Recommended outpatient follow-up with primary care provider  Patient is agreeable for above plan  Discharging Physician / Practitioner: Roderick Geiger MD  PCP: No primary care provider on file  Admission Date:   Admission Orders (From admission, onward)     Ordered        09/10/20 1330  Place in Observation  Once                   Discharge Date: 09/12/20    Resolved Problems  Date Reviewed: 9/12/2020    None          Consultations During Hospital Stay:  · Neurology    Reason for Admission:  Headache, blurry vision, left-sided weakness  Hospital Course:     Kellie Suggs is a 39 y o  male patient past medical history of obesity, , jenny smoking,  who originally presented to the hospital on 9/10/2020 due to sudden onset of severe headache, associated with blurry vision, left upper and lower extremity weakness, mild chest discomfort  Troponin negative x4  CTA head and neck negative for acute finding, brain MRI negative for acute finding  Patient's symptoms secondary to complex atypical migraine headache  Patient was treated with headache cocktail per neurology's recommendation with resolution of his symptoms  Neurology recommendation appreciated discharge with outpatient follow-up at headache clinic  Patient was seen ambulating in the hallway without any complaints  Patient does report resolution of his headache and blurry vision  also reports resolution  of left-sided weakness  Current hemodynamic is stable for discharge  Recommend close follow-up with primary care provider, Neurology and Headache clinic out pt  Patient is eager to be discharged  Wife present at bedside the multiple prescription  All questions and concerns have been addressed appropriately  Refer to [facility] which were negative        Please see above list of diagnoses and related plan for additional information  Condition at Discharge: good     Discharge Day Visit / Exam:     Subjective:  Afebrile, hemodynamically stable  Patient reports all his symptoms have not resolved  Seen  ambulating without any complaints  Denies chest pain, dyspnea, fever, chills, nausea, vomiting, abdominal pain, diarrhea, any deformities  No other events reported  patient is eager to go home  Vitals: Blood Pressure: 105/54 (09/12/20 0300)  Pulse: 67 (09/12/20 0300)  Temperature: 97 5 °F (36 4 °C) (09/12/20 0300)  Temp Source: Oral (09/12/20 0300)  Respirations: 18 (09/12/20 0300)  Height: 5' 8" (172 7 cm) (09/10/20 1049)  Weight - Scale: 109 kg (240 lb) (09/10/20 1049)  SpO2: 96 % (09/11/20 2300)  Exam:   Physical Exam  Constitutional:       General: He is not in acute distress  Appearance: Normal appearance  He is not ill-appearing  HENT:      Head: Normocephalic and atraumatic  Nose: No rhinorrhea  Mouth/Throat:      Pharynx: No oropharyngeal exudate or posterior oropharyngeal erythema  Eyes:      General: No scleral icterus  Right eye: No discharge  Left eye: No discharge  Extraocular Movements: Extraocular movements intact  Conjunctiva/sclera: Conjunctivae normal       Pupils: Pupils are equal, round, and reactive to light  Neck:      Musculoskeletal: Normal range of motion and neck supple  No neck rigidity or muscular tenderness  Cardiovascular:      Rate and Rhythm: Normal rate and regular rhythm  Pulses: Normal pulses  Heart sounds: Normal heart sounds  Pulmonary:      Effort: Pulmonary effort is normal  No respiratory distress  Breath sounds: Normal breath sounds  No stridor  No wheezing or rhonchi  Abdominal:      General: Bowel sounds are normal  There is no distension  Palpations: Abdomen is soft  Tenderness: There is no abdominal tenderness     Musculoskeletal: Normal range of motion  General: No swelling  Skin:     Findings: No rash  Neurological:      General: No focal deficit present  Mental Status: He is alert and oriented to person, place, and time  Mental status is at baseline  Psychiatric:         Mood and Affect: Mood normal          Behavior: Behavior normal          Discussion with Family: wife present at bedside  Discharge instructions/Information to patient and family:   See after visit summary for information provided to patient and family  Provisions for Follow-Up Care:  See after visit summary for information related to follow-up care and any pertinent home health orders  Disposition:     Home    For Discharges to   [address] [facility]:   · Not Applicable to this Patient - Not Applicable to this Patient    Planned Readmission:  None     Discharge Statement:  I spent 35 minutes discharging the patient  This time was spent on the day of discharge  I had direct contact with the patient on the day of discharge  Greater than 50% of the total time was spent examining patient, answering all patient questions, arranging and discussing plan of care with patient as well as directly providing post-discharge instructions  Additional time then spent on discharge activities  Discharge Medications:  See after visit summary for reconciled discharge medications provided to patient and family        ** Please Note: This note has been constructed using a voice recognition system **

## 2020-09-12 NOTE — DISCHARGE INSTRUCTIONS
Dolor de sandra mahin   LO QUE NECESITA SABER:   El dolor de [place] mahin es un dolor o molestia que comienza de reperuth y SCOTTIE rápidamente  Usted puede tener un dolor de sandra mahin sólo cuando siente estrés o come ciertos alimentos  Otro tipo dolor de sandra mahin puede producirse todos los días y a veces varias veces al día  INSTRUCCIONES SOBRE EL [facility]ITAL[facility]IA:   Busque atención médica de inmediato si:   · Usted tiene dolor intenso  · Usted tiene entumecimiento en un lado de katz santos o cuerpo  · Usted tiene un dolor de sandra que ocurre después de un golpe en la sandra, ray caída u otro trauma  · Tiene dolor de [place], está olvidadizo o confundido o tiene dificultad para hablar  · Tiene dolor de [place], rigidez en el michael y [place]  Pregúntele a katz Camille Pel vitaminas y minerales son adecuados para usted  · Usted tiene un dolor de sandra antonino y está vomitando  · Usted tiene dolor de sandra todos los días y no se [place] aun después de tratarlo  · Gabi kalani de [facility] u ocurren nuevos síntomas cuando tiene dolor de [place]  · Usted tiene preguntas o inquietudes acerca de katz condición o cuidado  Medicamentos:  Es posible que usted necesite alguno de los siguientes:  · Un medicamento con receta para el dolor  podrían ser Dot [employer]ake  El medicamento que recomienda katz médico dependerá del tipo de dolor de sandra que tenga  Usted necesitará maira medicamentos para el dolor de sandra según las indicaciones para evitar un problema llamado dolor de sandra de rebote  Estos kalani de [place] ocurren con el uso regular de analgésicos para los trastornos de dolor de [place]  · AINEs (Analgésicos antiinflamatorios no esteroides) fili el ibuprofeno, ayudan a disminuir la inflamación, el dolor y la [place]  Marlyn medicamento esta disponible con o sin ray receta médica  Los AINEs pueden causar sangrado estomacal o problemas renales en ciertas personas   Si usted malissa un medicamento anticoagulante, siempre pregúntele a katz médico si los DILLON son seguros para usted  Siempre tessa la etiqueta de claire medicamento y [place] instrucciones  · El acetaminofén  Kissousa el dolor y baja la fiebre  Está disponible sin receta médica  Pregunte la cantidad y la frecuencia con que debe tomarlos  [address]  Tessa las etiquetas de todos los demás medicamentos que esté usando para saber si también contienen acetaminofén, o pregunte a katz médico o farmacéutico  El acetaminofén puede causar daño en el hígado cuando no se malissa de forma correcta  No use más de 3 gramos (3,000 miligramos) en total de acetaminofeno en un día  · Antidepresivos  se pueden administrar para algunos tipos de kalani de [place]  · [facility] christine medicamentos fili se le haya indicado  Consulte con katz médico si usted fredi que katz medicamento no le está ayudando o si presenta efectos secundarios  Infórmele si es alérgico a cualquier medicamento  Mantenga rya lista actualizada de los [place], las vitaminas y los productos herbales que malissa  Incluya los siguientes datos de los medicamentos: cantidad, frecuencia y motivo de administración  Traiga con usted la lista o los envases de la píldoras a christine citas de seguimiento  Lleve la lista de los medicamentos con usted en catherine de ray emergencia  El manejo de katz síntomas:   · Aplique hielo o calor  en la mike donde katz hijo siente el dolor de sandra  Utilice un paquete (compresa) de hielo o calor  Para un paquete de hielo, también puede colocar hielo molido en ray bolsa plástica  Cubra el paquete de hielo o la bolsa con ray toalla pequeña antes de aplicarla en la piel  Tanto el hielo fili el calor ayudan a reducir el dolor, y el calor también contribuye a reducir los [employer]  Aplique calor margy 20 a 30 minutos cada 2 horas  Aplique hielo margy 15 a 20 minutos cada hora  Aplique calor o hielo margy el tiempo y la cantidad de días que se le indique   Dru Wesley puede alternar el calor y el hielo  · Relaje christine músculos  Acuéstese en ray posición cómoda y cierre christine ojos  Relaje christine músculos lentamente  Comience por los dedos de los pies y avance hacia arriba al kaley de katz cuerpo  · Registre en un diario christine kalani de [place]  Escriba cuándo comienzan y terminan christine migrañas  Bonne Gatewood y qué estaba haciendo cuando comenzó la migraña  Registre lo que comió y lo que tomó las 24 horas previas al comienzo de katz migraña  Shabana Maxon dolor y dónde le duele: Lleve un registro de lo que hizo para tratar katz Jaycob Scott y si obtuvo un resultado satisfactorio  Cómo prevenir un dolor de sandra mahin:   · Evite cualquier cosa que provoque un dolor de sandra mahin  Los ejemplos incluyen la exposición a sustancias químicas, las grandes altitudes o no dormir lo suficiente  Adilene ray rutina para dormir  Acuéstese y [place] días a la misma hora  No utilice aparatos electrónicos antes de acostarse  Pueden provocarle un dolor de sandra o impedirle dormir bina  · No fume  La nicotina y otras sustancias químicas en los cigarrillos y puros pueden desencadenar un dolor de sandra mahin o [place]  Pida información a katz médico si usted actualmente fuma y necesita ayuda para dejar de fumar  Los cigarrillos electrónicos o tabaco sin humo todavía contienen nicotina  Consulte con katz médico antes de [employer]  · Limite el consumo de alcohol según le indicaron  El alcohol puede provocar un dolor de sandra mahin o empeorarlo  Si usted tiene kalani de [place] de racimo, no toshia alcohol margy un episodio  Para otros tipos de kalani de [place], pregúntele a katz proveedor de atención médica si es seguro para usted beber alcohol  Pregunte cuál es la cantidad perdomo que puede beber y con qué frecuencia  · Ejercítese según indicaciones  El ejercicio puede reducir la tensión y [place] a aliviar el dolor de [place]   Propóngase hacer 30 minutos de [place] física vero todos los días de la [place]  Katz médico puede ayudarle a crear un plan de ejercicios  · Consuma alimentos saludables y variados  [address] frutas, verduras, productos lácteos bajos en grasa, ellis [place], pescado y frijoles cocidos  Katz médico o dietista puede ayudarle a crear planes de comidas si desea evitar los alimentos que provocan kalani de [place]  Acuda a christine consultas de control con katz médico según le indicaron  Traiga katz registro de kalani de sandra con usted cuando visite a katz médico  Anote christine preguntas para que se acuerde de hacerlas margy christine visitas  © 2017 [address] Torres Information is for End User's use only and may not be sold, redistributed or otherwise used for commercial purposes  All illustrations and images included in CareNotes® are the copyrighted property of A D A M , Inc  or Lance Guzman  Esta información es sólo para uso en educación  Katz intención no es darle un consejo médico sobre enfermedades o tratamientos  Colsulte con katz Marlene Bourdon farmacéutico antes de seguir cualquier régimen médico para saber si es seguro y efectivo para usted  Migraña   LO QUE NECESITA SABER:   ¿Qué es ray Amarilys Labrum? Giuseppe Ferns es un dolor de sandra intenso  El dolor puede ser tan severo que interfiere con christine actividades cotidianas  Giuseppe Ferns puede durar desde pocas horas hasta varios días  La causa exacta de la migraña no es conocida  ¿Qué puede desencadenar ray Amarilys Labrum?    · El estrés, fatiga de los ojos, dormir demasiado o no dormir lo suficiente    · [employer] debido a las píldoras anticonceptivas, embarazo, menopausia o margy la menstruación    · Omitir comidas, pasar mucho tiempo sin comer o no maira suficientes líquidos    · Ciertos alimentos o bebidas fili el chocolate, el queso christie, vino tinto o bebidas que contienen cafeína    · Alimentos que contienen gluten, nitratos, glutamato monosódico o endulzantes artificiales    · [employer], luces brillantes o luces parpadeantes, ruidos verenice, humo u olores verenice    · Calor, humedad o cambios en el clima  ¿Cuáles son las señales de advertencia de que ray migraña está por comenzar? Los signos de advertencia generalmente comienzan de 15 a 60 minutos antes del dolor de sandra:  · Cambios visuales (auras), fili visión borrosa, ceguera temporal o manchas brillantes, líneas o alucinaciones    · Cansancio anormal o bostezos frecuentes    · Hormigueo en katz brazo o pierna  ¿Cuáles son los signos y síntomas de Satish Ahmet migraña? La migraña comienza generalmente con un dolor monótono alrededor del doug o la sien  El dolor podría empeorar con el movimiento  Usted también podría tener lo siguiente:  · Dolor en katz sandra que podría aumentar hasta el punto que usted no es capaz de realizar christine actividades cotidianas    · Dolor en anjali o ambos lados de katz sandra    · Dolor punzante, pulsante o maurice en la sandra    · Náuseas y vómitos    · Sensibilidad a la sanjiv, el ruido o los olores  ¿Cómo se diagnostica ray Juliana Maurilio? Katz médico le hará preguntas acerca de christine kalani de [place]  Yamilet Headings dolor y otros síntomas, fili náuseas  Infórmele al médico si usted fredi que hubo algún desencadenante del dolor  El médico también querrá saber qué fue usted comió y tomó antes que le empezara el dolor  Infórmele al médico sobre cualquier afección médica que usted tenga o sea característica de katz johan  Incluya cualquier estresor reciente que ha tenido  Es posible que también necesite alguno de los siguientes tratamientos:  · Un examen neurológico  se [place] para revisar cómo reaccionan las pupilas a la sanjiv  Aktz médico podría revisar katz memoria, la forma en que agarra las cosas con katz mano y el equilibrio  · Las imágenes por tomografía computarizada o por resonancia magnética  podrían maira imágenes de katz cerebro   Es posible que le administren un medio de contraste que sirve para que el cerebro se observe con mayor claridad en las imágenes  Dígale al médico si usted alguna vez ha tenido ray reacción alérgica al líquido de [place]  No entre a la cristian donde se realiza la resonancia magnética con algo de metal  El metal puede causar lesiones serias  Dígale al médico si usted tiene algo de metal por dentro o sobre katz cuerpo  ¿Cómo se trata Nica Rakers? Las migrañas no [place]  La meta del tratamiento es reducir christine síntomas  [place] medicamento tan pronto fili sienta que le comienza Nica Rakers  · Un medicamento con receta para el dolor  podrían ser Hershal Simmer  No espere a que el dolor sea muy intenso para maira el medicamento  · Medicamentos para la migraña  se [place] para evitar ray migraña o detenerla ray vez que comience  · Los medicamentos contra las náuseas  pueden darse para calmar katz estómago y ayudarle a prevenir los vómitos  Marlyn medicamento también puede aliviar el dolor  ¿Qué puedo hacer para manejar mis síntomas? · Repose en ray habitación oscura y [place]  [place] ayudará a disminuir el dolor  El dormir también podría ayudarlo a aliviar katz dolor  · Aplique hielo para reducir el dolor  Use un paquete de hielo o ponga hielo molido dentro de [employer]  Cubra el paquete de hielo con ray toalla y colóqueselo en la sandra  Aplique hielo margy 15 a 20 minutos cada hora  · Aplique calor para disminuir el dolor y los espasmos musculares  Utilice ray toalla pequeña empapada con [place], ray almohada térmica o tome un baño de ferny con agua tibia  Aplique la compresa caliente sobre el área por 20 a 30 minutos cada 2 horas  Usted puede alternar el calor y el hielo  · Mantenga un registro de las migrañas  Escriba cuándo comienzan y terminan christine migrañas  Flavia Riff y [place] haciendo cuando comenzó [place]  Registre lo que comió y lo que tomó las 24 horas antes de que comenzó katz migraña   Mantenga un registro de lo que hizo para tratar katz Kian Caprice y si Jules Anthony  Corita Lota el registro de las migrañas con usted a las citas con katz médico   ¿Qué puedo hacer para evitar otra migraña? · No fume  La nicotina y otras sustancias químicas en los cigarrillos y puros pueden desencadenar ray Candia Foster  Pida información a katz médico si usted actualmente fuma y necesita ayuda para dejar de fumar  Los cigarrillos electrónicos o tabaco sin humo todavía contienen nicotina  Consulte con katz médico antes de [employer]  · No consuma alcohol  El alcohol puede provocar migraña  También puede impedir que [employer] medicamentos para la migraña  · Ejercítese regularmente  El ejercicio puede ayudar a evitar migrañas  Consulte con katz médico acerca de cuál es el mejor régimen de ejercicio para usted  Trate de hacer unos 30 minutos de ejercicio todos los días que pueda  · Controle el estrés  El estrés podría provocar migraña  Aprenda nuevas maneras de relajarse fili la respiración profunda  · Establezca un horario para dormir  Acuéstese y levántese a la misma hora cada día  No laya televisión inmediatamente antes de acostarse  · Coma gabi comidas regularmente  Incluya alimentos [employer] fruta, verduras, panes de grano entero, productos lácteos bajos en grasa, frijoles, carne magra y pescado  No consuma alimentos o bebidas que puedan desencadenar gabi migrañas  ¿Cuándo nilsa buscar atención inmediata? · Usted tiene dolor de sandra que parece ser diferente o mucho peor que katz migraña habitual     · Usted tiene un dolor de sandra severo con fiebre o rigidez en el michael  · Usted tiene nuevos problemas con el habla, la visión, el equilibrio o el [address]  · Usted siente que se va a desmayar, se siente confundido o sufre ray convulsión  ¿Cuándo nilsa comunicarme con mi médico?   · Katz migraña interfiere con gabi actividades cotidianas  · Gabi medicamentos o tratamientos halima de funcionar      · Usted tiene preguntas o inquietudes acerca de hardin condición o cuidado  ACUERDOS SOBRE HARDIN CUIDADO:   Usted tiene el derecho de ayudar a planear hardin cuidado  Aprenda todo lo que pueda sobre hardin condición y fili darle tratamiento  Discuta christine opciones de tratamiento con christine médicos para decidir el cuidado que usted desea recibir  Usted siempre tiene el derecho de rechazar el tratamiento  Esta información es sólo para uso en educación  [place] intención no es darle un consejo médico sobre enfermedades o tratamientos  Colsulte con hardin Vanda Maldonado farmacéutico antes de seguir cualquier régimen médico para saber si es seguro y efectivo para usted  © 2017 [address] Torres Information is for End User's use only and may not be sold, redistributed or otherwise used for commercial purposes  All illustrations and images included in CareNotes® are the copyrighted property of [employer] A M , Inc  or Lance Megan  Migraine Headache   WHAT YOU NEED TO KNOW:   What is a migraine headache? A migraine is a severe headache  The pain can be so severe that it interferes with your daily activities  A migraine can last a few hours up to several days  The exact cause of migraines is not known  What can trigger a migraine headache? · Stress, eye strain, oversleeping, or not getting enough sleep    · Hormone changes in women from birth control pills, pregnancy, menopause, or during a monthly period    · Skipping meals, going too long without eating, or not drinking enough liquids    · Certain foods or drinks such as chocolate, hard cheese, red wine, or drinks that contain caffeine    · Foods that contain gluten, nitrates, MSG, or artificial sweeteners    · Sunlight, bright or flashing lights, loud noises, smoke, or strong smells    · Heat, humidity, or changes in the weather  What are the warning signs that a migraine headache is about to start?   Warning signs usually start 15 to 60 minutes before the headache:  · Visual changes (auras), such as blurred vision, temporary blind or bright spots, lines, or hallucinations    · Unusual tiredness or frequent yawning    · Tingling in an arm or leg  What are the signs and symptoms of a migraine headache? A migraine headache usually begins as a dull ache around the eye or temple  The pain may get worse with movement  You may also have the following:  · Pain in your head that may increase to the point that you cannot do everyday activities    · Pain on one or both sides of your head    · Throbbing, pulsing, or pounding pain in your head    · Nausea and vomiting    · Sensitivity to light, noise, or smells  How is a migraine headache diagnosed? Your healthcare provider will ask questions about your headaches  Describe the pain and any other symptoms, such as nausea  Tell the provider if you think anything triggered the pain  The provider will also want to know what you ate and drank before the pain started  Tell the provider about any medical conditions you have or that run in your family  Include any recent stressors you have had  You may also need any of the following:  · A neurologic exam  is used to check how your pupils react to light  Your healthcare provider may check your memory, hand grasp, and balance  · CT or MRI pictures  may be taken of your brain  You may be given contrast liquid to help your brain show up better in the pictures  Tell the healthcare provider if you have ever had an allergic reaction to contrast liquid  Do not enter the MRI room with anything metal  Metal can cause serious injury  Tell the healthcare provider if you have any metal in or on your body  How is a migraine headache treated? Migraines cannot be cured  The goal of treatment is to reduce your symptoms  Take medicine as soon as you feel a migraine begin  · Prescription pain medicine  may be given  Do not wait until the pain is severe before you take your medicine       · Migraine medicines  are used to help prevent a migraine or stop it once it starts  · Antinausea medicine  may be given to calm your stomach and to help prevent vomiting  This medicine can also help relieve pain  What can I do to manage my symptoms? · Rest in a dark, quiet room  This will help decrease your pain  Sleep may also help relieve the pain  · Apply ice to decrease pain  Use an ice pack, or put crushed ice in a plastic bag  Cover the ice pack with a towel and place it on your head  Apply ice for 15 to 20 minutes every hour  · Apply heat to decrease pain and muscle spasms  Use a small towel dampened with warm water or a heating pad, or sit in a warm bath  Apply heat on the area for 20 to 30 minutes every 2 hours  You may alternate heat and ice  · Keep a migraine record  Write down when your migraines start and stop  Include your symptoms and what you were doing when a migraine began  Record what you ate or drank for 24 hours before the migraine started  Keep track of what you did to treat your migraine and if it worked  Bring the migraine record with you to visits with your healthcare provider  What can I do to prevent another migraine headache? · Do not smoke  Nicotine and other chemicals in cigarettes and cigars can trigger a migraine or make it worse  Ask your healthcare provider for information if you currently smoke and need help to quit  E-cigarettes or smokeless tobacco still contain nicotine  Talk to your healthcare provider before you use these products  · Do not drink alcohol  Alcohol can trigger a migraine  It can also keep medicines used to treat your migraines from working  · Get regular exercise  Exercise may help prevent migraines  Talk to your healthcare provider about the best exercise plan for you  Try to get at least 30 minutes of exercise on most days  · Manage stress  Stress may trigger a migraine  Learn new ways to relax, such as deep breathing  · Create a sleep schedule    Go to bed and get up at the same times each day  Do not watch television before bed  · Eat regular meals  Include healthy foods such as include fruit, vegetables, whole-grain breads, low-fat dairy products, beans, lean meat, and fish  Do not have food or drinks that trigger your migraines  When should I seek immediate care? · You have a headache that seems different or much worse than your usual migraine headache  · You have a severe headache with a fever or a stiff neck  · You have new problems with speech, vision, balance, or movement  · You feel like you are going to faint, you become confused, or you have a seizure  When should I contact my healthcare provider? · Your migraines interfere with your daily activities  · Your medicines or treatments stop working  · You have questions or concerns about your condition or care  CARE AGREEMENT:   You have the right to help plan your care  Learn about your health condition and how it may be treated  Discuss treatment options with your caregivers to decide what care you want to receive  You always have the right to refuse treatment  The above information is an  only  It is not intended as medical advice for individual conditions or treatments  Talk to your doctor, nurse or pharmacist before following any medical regimen to see if it is safe and effective for you  © 2017 [address]  Information is for End User's use only and may not be sold, redistributed or otherwise used for commercial purposes  All illustrations and images included in CareNotes® are the copyrighted property of A D A M , Inc  or Lance Guzman

## 2020-09-12 NOTE — DISCHARGE INSTR - AVS FIRST PAGE
Recommended close follow-up with primary care provider in 3-5 days of discharge  Recommended close follow-up with Neurology and headache clinic outpatient closely  Take Tylenol and ibuprofen  alternatively for headache

## 2020-09-12 NOTE — UTILIZATION REVIEW
Notification of Observation Admission/Observation Authorization Request   This is a Notification of Observation Admission for [address]  Be advised that this patient was admitted to our facility under Observation Status  Contact Nasir Pfeiffer at [phone] for additional admission information  [facility] DEPT DEDICATED Destiny Livingston [phone]  Patient Name:   Adam Mcclain   YOB: 1975       [address]   [address]:   [address]   Tax ID: [id number]  NPI: [id number] Attending Provider/NPI:  Phone:  Address: [address] [[id number]]  [phone]  Same as the CLEMENTINE/Diane Barfield [address]6 of Service Code: 25     Place of Service Name:  CPT Code for Observation:  On [address] / CPT [address]796   Start Date:  Discharge Date & Time: No discharge date for patient encounter  Type of Admission: Observation Status Discharge Disposition   (if discharged): Final discharge disposition not confirmed   Patient Diagnoses: Blurred vision [H53 8]  Chest pain [R07 9]  Left arm weakness [R29 898]  Blurred vision, bilateral [H53 8]  Headache [R51]  Weakness of both lower extremities [R29 898]     Orders: Admission Orders (From admission, onward)     Ordered        09/10/20 1330  Place in Observation  Once                    Assigned Utilization Review Contact: Nasir Pfeiffer  Utilization   Network Utilization Review Department  Phone: [phone]; Fax [phone]  Email: [email or web address]   ATTENTION PAYERS: Please call the assigned Utilization  directly with any questions or concerns ALL voicemails in the department are confidential  Send all requests for admission clinical reviews, approved or denied determinations and any other requests to dedicated fax number belonging to the campus where the patient is receiving treatment

## 2020-09-14 ENCOUNTER — TELEPHONE (OUTPATIENT)
Dept: NEUROLOGY | Facility: CLINIC | Age: 45
End: 2020-09-14

## 2020-09-14 NOTE — UTILIZATION REVIEW
Notification of Discharge  This is a Notification of Discharge from our facility [address]  Please be advised that this patient has been discharge from our facility  Below you will find the admission and discharge date and time including the patients disposition  PRESENTATION DATE: 9/10/2020 10:51 AM  OBS ADMISSION DATE:   IP ADMISSION DATE: N/A N/A   DISCHARGE DATE: 9/12/2020 11:59 AM  DISPOSITION: Home/Self Care Home/Self Care   Admission Orders listed below:  Admission Orders (From admission, onward)     Ordered        09/10/20 1330  Place in Observation  Once                   Please contact the UR Department if additional information is required to close this patient's authorization/case  [facility] Utilization Review Department  Main: [phone] x carefully listen to the prompts  All voicemails are confidential   [email or web address]  Send all requests for admission clinical reviews, approved or denied determinations and any other requests to dedicated fax number below belonging to the campus where the patient is receiving treatment   List of dedicated fax numbers:  [address] DENIALS (Administrative/Medical Necessity) [phone]   [address] (Maternity/NICU/Pediatrics) [phone]   [facility] [phone]   [facility] [phone]   [address] [place] [address] [phone]   [facility] 1525 [facility] [phone]   [facility] [phone]   [address] [facility]  [address] [facility] 1000 [facility] [phone]

## 2025-05-01 NOTE — CONSULTS
Consultation - Neurology   Mathis Dakin 39 y o  male MRN: [id number]  Unit/Bed#: -01 Encounter: [id number]      Assessment/Plan   Mathis Dakin is a 39 y o  male with obesity, but no other known PMH who presented to [facility] ED on 9/10/2020 for sudden severe 10/10 stabbing right temporal headache, L UE/LE weakness and sensory changes, and mild chest discomfort  Upon arrival, /100, but remainder of vitals stable  Labs revealed elevated hemoglobin of 18 0, but remainder of labs unremarkable  CTA head/neck negative for hemorrhage, acute infarct, high grade stenosis, or LVO  CTA chest/abd/pelvis unremarkable  Patient received Toradol and Reglan while in the ED  On exam today, headache is a 4-5/10 pain, but continues to have weakness and sensory changes in left LE  Etiology remains unclear, but concern for CVA vs complex migraine  ADDENDUM: MRI brain personally reviewed and no acute infarct seen; final report pending  Etiology likely secondary to complex migraine  * Headache  Assessment & Plan  - Initially had 10/10 stabbing right temporal headache  - Received Toradol and Reglan while in the ED  - Today headache is a 4-5/10 pain  - CTA head/neck unremarkable  - Continue Toradol 30 mg Q8 hours  - Continue Reglan 10 mg Q8 hours  - Continue Benadryl 25 mg Q8 hours PRN  - Start Magnesium 1 g BID x2 days    Weakness of left lower extremity and sensory changes  Assessment & Plan  Results:  - CTA head/neck:  · No mass effect, acute intracranial hemorrhage or evidence of recent infarction  · No evidence for high-grade stenosis, focal occlusion or vascular aneurysm of the intracranial circulation  · No evidence for high-grade stenosis or focal occlusion of the cervical vasculature  - CTA chest/abd/pelvis:  · No CT angiographic abnormality to account for the patient's symptoms    Specifically, no evidence of aneurysm, arterial vascular dissection, or flow-limiting vascular stenosis in the chest, abdomen or pelvis  - LDL 97, Cholesterol 198, Triglycerides 354  - Hemoglobin A1C: 5 9  - Hemoglobin 18 0 on admission    Plan:  - Stroke pathway   MRI brain pending   Echo pending   Start Aspirin 81 mg daily   Start Atorvastatin 40 mg daily   Permissive HTN, labetalol if SBP >200   Euglycemic, normothermic goal   Continue telemetry   PT/OT/ST   Frequent neuro checks  Continue to monitor and notify neurology with any changes   STAT CT head for any acute change in neuro exam  - Medical management and supportive care per primary team  Correction of any metabolic or infectious disturbances  Recommendations for outpatient neurological follow up have yet to be determined  History of Present Illness     Reason for Consult / Principal Problem: Headache  Hx and PE limited by: N/A  HPI: Rosalind Litten is a 39 y o  male with obesity, but no other known PMH who presented to [facility] ED on 9/10/2020 for sudden severe right frontal headache, L UE/LE weakness and sensory changes, and mild chest discomfort  History obtained per patient  Over the last month, patient has been noticing some chest discomfort and a mild headache that only lasts for a few seconds  However, yesterday patient was relaxing at home and had sudden severe 10/10 "stabbing" pain in the right temple associated with blurred vision, photophobia, phonophobia  Patient also noted decreased sensation and significant weakness in the left arm and leg and back pain  Due to the "excruciating" pain and left-sided weakness, patient came to the ER  Upon arrival, /100, but remainder of vitals stable  Labs revealed elevated hemoglobin of 18 0, but remainder of labs unremarkable  CTA head/neck negative for hemorrhage, acute infarct, high grade stenosis, or LVO  CTA chest/abd/pelvis unremarkable  Patient received Toradol and Reglan while in the ED      Today, patient states that the headache is now only a 4-5/10 pain  The vision is back to baseline and no longer has photophobia or phonophobia  Patient continues to have weakness in the left arm/leg (leg is weaker than the arm), but it is slightly better today compared to yesterday  The left leg has decreased sensation compared to the right leg  He is also having pain and swelling in his left index finger, making it difficult to   Denies any lightheadedness/dizziness, speech disturbances, shortness of breath, and abdominal pain  No known medical history  Inpatient consult to Neurology  Consult performed by: Ezequiel Villagran PA-C  Consult ordered by: Bruna Worthy MD          Review of Systems   Constitutional: Negative for fatigue and fever  HENT: Negative for trouble swallowing and voice change  Eyes: Negative for photophobia and visual disturbance (resolved)  Respiratory: Negative for chest tightness and shortness of breath  Cardiovascular: Negative for chest pain and palpitations  Gastrointestinal: Negative for abdominal pain, nausea and vomiting  Genitourinary: Negative for difficulty urinating  Musculoskeletal: Positive for arthralgias, back pain and gait problem  Neurological: Positive for weakness, numbness and headaches  Negative for dizziness, tremors, facial asymmetry, speech difficulty and light-headedness  Psychiatric/Behavioral: Negative for behavioral problems and confusion  Historical Information   History reviewed  No pertinent past medical history  History reviewed  No pertinent surgical history  Social History   Social History     Substance and Sexual Activity   Alcohol Use Yes    Frequency: 2-4 times a month    Drinks per session: 1 or 2     Social History     Substance and Sexual Activity   Drug Use Never     E-Cigarette/Vaping     E-Cigarette/Vaping Substances     Social History     Tobacco Use   Smoking Status Never Smoker   Smokeless Tobacco Never Used     Family History: History reviewed   No pertinent family history  Review of previous medical records was completed  Reviewed prior notes, labs, CTA head/neck, CTA chest/abd/pelvis    Meds/Allergies   all current active meds have been reviewed, current meds:   Current Facility-Administered Medications   Medication Dose Route Frequency    acetaminophen (TYLENOL) tablet 650 mg  650 mg Oral Q6H PRN    aspirin chewable tablet 81 mg  81 mg Oral Daily    atorvastatin (LIPITOR) tablet 40 mg  40 mg Oral QPM    diphenhydrAMINE (BENADRYL) injection 25 mg  25 mg Intravenous Q8H PRN    enoxaparin (LOVENOX) subcutaneous injection 40 mg  40 mg Subcutaneous Daily    HYDROmorphone (DILAUDID) injection 0 2 mg  0 2 mg Intravenous Q4H PRN    ketorolac (TORADOL) injection 30 mg  30 mg Intravenous Q12H [facility]    metoclopramide (REGLAN) injection 10 mg  10 mg Intravenous Q8H [facility]    oxyCODONE (ROXICODONE) IR tablet 5 mg  5 mg Oral Q6H PRN    and PTA meds:   None       No Known Allergies    Objective   Vitals:Blood pressure 128/85, pulse 94, temperature 97 9 °F (36 6 °C), resp  rate 18, height 5' 8" (1 727 m), weight 109 kg (240 lb), SpO2 97 %  ,Body mass index is 36 49 kg/m²  No intake or output data in the 24 hours ending 09/11/20 0846    Invasive Devices: Invasive Devices     Peripheral Intravenous Line            Peripheral IV 09/10/20 Left Antecubital less than 1 day                Physical Exam  Vitals signs and nursing note reviewed  Constitutional:       Appearance: Normal appearance  He is obese  Comments: Patient sitting comfortably in bedside chair in no acute distress  HENT:      Head: Normocephalic and atraumatic  Nose: Nose normal       Mouth/Throat:      Mouth: Mucous membranes are moist       Pharynx: Oropharynx is clear  Eyes:      Extraocular Movements: Extraocular movements intact  Conjunctiva/sclera: Conjunctivae normal       Pupils: Pupils are equal, round, and reactive to light     Neck:      Musculoskeletal: Normal range of motion  Pulmonary:      Effort: Pulmonary effort is normal    Musculoskeletal:      Comments: Edema of left index finger and pain with passive ROM  Decreased strength in left   Decreased strength in left LE  Skin:     General: Skin is warm and dry  Capillary Refill: Capillary refill takes less than 2 seconds  Neurological:      Mental Status: He is alert and oriented to person, place, and time  Cranial Nerves: Cranial nerve deficit present  Sensory: Sensory deficit present  Motor: Weakness present  Deep Tendon Reflexes:      Reflex Scores:       Bicep reflexes are 2+ on the right side and 2+ on the left side  Brachioradialis reflexes are 2+ on the right side and 2+ on the left side  Patellar reflexes are 1+ on the right side and 1+ on the left side  Achilles reflexes are 1+ on the right side and 1+ on the left side  Comments: See full neuro exam below   Psychiatric:         Mood and Affect: Mood normal        Neurologic Exam     Mental Status   Oriented to person, place, and time  Patient alert and oriented to person, place, city, month, and year  No dysarthria or aphasia  Able to follow simple commands, but slight difficulty with cross over commands  Normal attention and concentration  Cranial Nerves     CN III, IV, VI   Pupils are equal, round, and reactive to light  Pupils 3 mm, round, reactive to light bilaterally  EOMs intact without nystagmus  No visual field deficits  Slight flattening of left nasolabial fold  Palate raises symmetrically  Tongue midline without fasciculations  Facial sensation to light touch intact throughout  Full strength in sternocleidomastoid and trapezius muscles  Motor Exam   Muscle bulk: normal  Overall muscle tone: normal  Right arm pronator drift: absent  Left arm pronator drift: absent  Slightly decreased left  strength, likely secondary to painful and edematous left index finger    Remainder of strength testing in bilateral upper extremities 5/5   4-/5 left hip flexor, left quadriceps/hamstrings, 2/5 left dorsiflexion, 3+/5 left plantar flexion  Sensory Exam   Decreased sensation to light touch, temperature, vibration, and pinprick in left lower extremity  Remainder of sensation testing intact  Gait, Coordination, and Reflexes     Gait  Gait: (Deferred due to significant lower extremity weakness)    Reflexes   Right brachioradialis: 2+  Left brachioradialis: 2+  Right biceps: 2+  Left biceps: 2+  Right patellar: 1+  Left patellar: 1+  Right achilles: 1+  Left achilles: 1+  No ataxia with finger-to-nose bilaterally  No resting or action tremor  No ankle clonus bilaterally  Downgoing right toe  Mute left toe  Lab Results:   I have personally reviewed pertinent reports  , CBC:   Results from last 7 days   Lab Units 09/10/20  1105   WBC Thousand/uL 8 48   RBC Million/uL 5 99*   HEMOGLOBIN g/dL 18 0*   HEMATOCRIT % 53 7*   MCV fL 90   PLATELETS Thousands/uL 349   , BMP/CMP:   Results from last 7 days   Lab Units 09/10/20  1206   SODIUM mmol/L 135*   POTASSIUM mmol/L 4 2   CHLORIDE mmol/L 101   CO2 mmol/L 24   BUN mg/dL 13   CREATININE mg/dL 1 18   CALCIUM mg/dL 9 2   AST U/L 19   ALT U/L 43   ALK PHOS U/L 79   EGFR ml/min/1 73sq m 75   , Vitamin B12:   , HgBA1C:   Results from last 7 days   Lab Units 09/11/20  0500   HEMOGLOBIN A1C % 5 9*   , TSH:   , Coagulation:   Results from last 7 days   Lab Units 09/10/20  1105   INR  0 99   , Lipid Profile:   Results from last 7 days   Lab Units 09/11/20  0500   HDL mg/dL 30*   LDL CALC mg/dL 97   TRIGLYCERIDES mg/dL 354*     Imaging Studies: I have personally reviewed pertinent reports  and I have personally reviewed pertinent films in PACS  EKG, Pathology, and Other Studies: I have personally reviewed pertinent reports     and I have personally reviewed pertinent films in PACS  VTE Prophylaxis: Sequential compression device (Venodyne)  and Enoxaparin (Lovenox)    Code Status: Level 1 - Full Code    Counseling / Coordination of Care  Total time spent today 47 minutes  Greater than 50% of total time was spent with the patient and/or family counseling and/or coordination of care  A description of the counseling/coordination of care:  Patient was seen and evaluated  Discussed with attending  Chart reviewed thoroughly including laboratory and imaging studies    Plan of care discussed with patient and primary team  Resident